# Patient Record
Sex: FEMALE | URBAN - METROPOLITAN AREA
[De-identification: names, ages, dates, MRNs, and addresses within clinical notes are randomized per-mention and may not be internally consistent; named-entity substitution may affect disease eponyms.]

---

## 2021-05-20 ENCOUNTER — TELEPHONE (OUTPATIENT)
Dept: FAMILY MEDICINE CLINIC | Facility: CLINIC | Age: 21
End: 2021-05-20

## 2021-05-20 NOTE — TELEPHONE ENCOUNTER
Patient is not assigned to our practice, but still showing up as our patient  She was never seen here  Thank you

## 2023-07-05 ENCOUNTER — APPOINTMENT (OUTPATIENT)
Dept: LAB | Facility: MEDICAL CENTER | Age: 23
End: 2023-07-05

## 2023-07-05 ENCOUNTER — OFFICE VISIT (OUTPATIENT)
Dept: FAMILY MEDICINE CLINIC | Facility: CLINIC | Age: 23
End: 2023-07-05
Payer: COMMERCIAL

## 2023-07-05 VITALS
HEIGHT: 66 IN | WEIGHT: 163.8 LBS | DIASTOLIC BLOOD PRESSURE: 68 MMHG | OXYGEN SATURATION: 99 % | TEMPERATURE: 97.4 F | BODY MASS INDEX: 26.33 KG/M2 | SYSTOLIC BLOOD PRESSURE: 114 MMHG | HEART RATE: 93 BPM

## 2023-07-05 DIAGNOSIS — R11.0 POSTPRANDIAL NAUSEA: ICD-10-CM

## 2023-07-05 DIAGNOSIS — Z76.89 ENCOUNTER TO ESTABLISH CARE: Primary | ICD-10-CM

## 2023-07-05 DIAGNOSIS — R15.2 FECAL URGENCY: ICD-10-CM

## 2023-07-05 DIAGNOSIS — F32.A ANXIETY AND DEPRESSION: ICD-10-CM

## 2023-07-05 DIAGNOSIS — F41.9 ANXIETY AND DEPRESSION: ICD-10-CM

## 2023-07-05 DIAGNOSIS — Z76.89 ENCOUNTER TO ESTABLISH CARE: ICD-10-CM

## 2023-07-05 LAB
ALBUMIN SERPL BCP-MCNC: 4 G/DL (ref 3.5–5)
ALP SERPL-CCNC: 59 U/L (ref 46–116)
ALT SERPL W P-5'-P-CCNC: 25 U/L (ref 12–78)
AMYLASE SERPL-CCNC: 46 IU/L (ref 25–115)
ANION GAP SERPL CALCULATED.3IONS-SCNC: 7 MMOL/L
AST SERPL W P-5'-P-CCNC: 15 U/L (ref 5–45)
BACTERIA UR QL AUTO: ABNORMAL /HPF
BASOPHILS # BLD AUTO: 0.02 THOUSANDS/ÂΜL (ref 0–0.1)
BASOPHILS NFR BLD AUTO: 0 % (ref 0–1)
BILIRUB SERPL-MCNC: 0.64 MG/DL (ref 0.2–1)
BILIRUB UR QL STRIP: ABNORMAL
BUN SERPL-MCNC: 6 MG/DL (ref 5–25)
CALCIUM SERPL-MCNC: 9.3 MG/DL (ref 8.3–10.1)
CAOX CRY URNS QL MICRO: ABNORMAL /HPF
CHLORIDE SERPL-SCNC: 106 MMOL/L (ref 96–108)
CLARITY UR: ABNORMAL
CO2 SERPL-SCNC: 25 MMOL/L (ref 21–32)
COLOR UR: ABNORMAL
CREAT SERPL-MCNC: 0.74 MG/DL (ref 0.6–1.3)
EOSINOPHIL # BLD AUTO: 0.05 THOUSAND/ÂΜL (ref 0–0.61)
EOSINOPHIL NFR BLD AUTO: 1 % (ref 0–6)
ERYTHROCYTE [DISTWIDTH] IN BLOOD BY AUTOMATED COUNT: 12.4 % (ref 11.6–15.1)
GFR SERPL CREATININE-BSD FRML MDRD: 115 ML/MIN/1.73SQ M
GLUCOSE SERPL-MCNC: 95 MG/DL (ref 65–140)
GLUCOSE UR STRIP-MCNC: ABNORMAL MG/DL
HCT VFR BLD AUTO: 41.4 % (ref 34.8–46.1)
HGB BLD-MCNC: 14.5 G/DL (ref 11.5–15.4)
HGB UR QL STRIP.AUTO: ABNORMAL
HYALINE CASTS #/AREA URNS LPF: ABNORMAL /LPF
IMM GRANULOCYTES # BLD AUTO: 0.02 THOUSAND/UL (ref 0–0.2)
IMM GRANULOCYTES NFR BLD AUTO: 0 % (ref 0–2)
KETONES UR STRIP-MCNC: ABNORMAL MG/DL
LEUKOCYTE ESTERASE UR QL STRIP: ABNORMAL
LIPASE SERPL-CCNC: 133 U/L (ref 73–393)
LYMPHOCYTES # BLD AUTO: 1.39 THOUSANDS/ÂΜL (ref 0.6–4.47)
LYMPHOCYTES NFR BLD AUTO: 23 % (ref 14–44)
MCH RBC QN AUTO: 32.1 PG (ref 26.8–34.3)
MCHC RBC AUTO-ENTMCNC: 35 G/DL (ref 31.4–37.4)
MCV RBC AUTO: 92 FL (ref 82–98)
MONOCYTES # BLD AUTO: 0.41 THOUSAND/ÂΜL (ref 0.17–1.22)
MONOCYTES NFR BLD AUTO: 7 % (ref 4–12)
MUCOUS THREADS UR QL AUTO: ABNORMAL
NEUTROPHILS # BLD AUTO: 4.08 THOUSANDS/ÂΜL (ref 1.85–7.62)
NEUTS SEG NFR BLD AUTO: 69 % (ref 43–75)
NITRITE UR QL STRIP: NEGATIVE
NON-SQ EPI CELLS URNS QL MICRO: ABNORMAL /HPF
NRBC BLD AUTO-RTO: 0 /100 WBCS
PH UR STRIP.AUTO: 6 [PH]
PLATELET # BLD AUTO: 268 THOUSANDS/UL (ref 149–390)
PMV BLD AUTO: 12.1 FL (ref 8.9–12.7)
POTASSIUM SERPL-SCNC: 3.8 MMOL/L (ref 3.5–5.3)
PROT SERPL-MCNC: 7.9 G/DL (ref 6.4–8.4)
PROT UR STRIP-MCNC: ABNORMAL MG/DL
RBC # BLD AUTO: 4.52 MILLION/UL (ref 3.81–5.12)
RBC #/AREA URNS AUTO: ABNORMAL /HPF
SODIUM SERPL-SCNC: 138 MMOL/L (ref 135–147)
SP GR UR STRIP.AUTO: 1.03 (ref 1–1.03)
TSH SERPL DL<=0.05 MIU/L-ACNC: 1.78 UIU/ML (ref 0.45–4.5)
UROBILINOGEN UR STRIP-ACNC: 4 MG/DL
WBC # BLD AUTO: 5.97 THOUSAND/UL (ref 4.31–10.16)
WBC #/AREA URNS AUTO: ABNORMAL /HPF

## 2023-07-05 PROCEDURE — 85025 COMPLETE CBC W/AUTO DIFF WBC: CPT

## 2023-07-05 PROCEDURE — 99204 OFFICE O/P NEW MOD 45 MIN: CPT | Performed by: FAMILY MEDICINE

## 2023-07-05 PROCEDURE — 81001 URINALYSIS AUTO W/SCOPE: CPT

## 2023-07-05 PROCEDURE — 84443 ASSAY THYROID STIM HORMONE: CPT

## 2023-07-05 PROCEDURE — 87086 URINE CULTURE/COLONY COUNT: CPT

## 2023-07-05 PROCEDURE — 83690 ASSAY OF LIPASE: CPT

## 2023-07-05 PROCEDURE — 80053 COMPREHEN METABOLIC PANEL: CPT

## 2023-07-05 PROCEDURE — 82150 ASSAY OF AMYLASE: CPT

## 2023-07-05 PROCEDURE — 36415 COLL VENOUS BLD VENIPUNCTURE: CPT

## 2023-07-05 RX ORDER — PANTOPRAZOLE SODIUM 40 MG/1
40 TABLET, DELAYED RELEASE ORAL DAILY
Qty: 90 TABLET | Refills: 0 | Status: SHIPPED | OUTPATIENT
Start: 2023-07-05

## 2023-07-05 RX ORDER — NORETHINDRONE ACETATE AND ETHINYL ESTRADIOL 1.5-30(21)
KIT ORAL
COMMUNITY
Start: 2023-05-24

## 2023-07-05 RX ORDER — NORETHINDRONE ACETATE AND ETHINYL ESTRADIOL AND FERROUS FUMARATE 1.5-30(21)
KIT ORAL
COMMUNITY
Start: 2023-05-25

## 2023-07-05 NOTE — PATIENT INSTRUCTIONS
Try looking into these apps  'Wysa', 'Cerebral' 'BetterHelp'    Try to see if either a gluten free diet or low Fodmap diet helps with the symptoms

## 2023-07-05 NOTE — PROGRESS NOTES
Assessment/Plan:    No problem-specific Assessment & Plan notes found for this encounter. Return in about 4 weeks (around 8/2/2023) for Annual physical.      Patient Instructions   Try looking into these apps  'Wysa', 'Cerebral' 'BetterHelp'    Try to see if either a gluten free diet or low Fodmap diet helps with the symptoms       Diagnoses and all orders for this visit:    Encounter to establish care  -     CBC and differential; Future  -     Comprehensive metabolic panel; Future  -     UA w Reflex to Microscopic w Reflex to Culture -Lab Collect; Future  -     Lipase; Future  -     Amylase; Future  -     TSH, 3rd generation with Free T4 reflex; Future    Postprandial nausea  -     pantoprazole (PROTONIX) 40 mg tablet; Take 1 tablet (40 mg total) by mouth daily    Anxiety and depression    Fecal urgency    Other orders  -     Deja FE 1.5/30 1.5-30 MG-MCG tablet; TAKE 1 TABLET BY MOUTH DAILY. ACTIVE PILLS ONLY. (Patient not taking: Reported on 7/5/2023)  -     norethindrone-ethinyl estradiol-iron (Junel FE 1.5/30) 1.5-30 MG-MCG tablet; TAKE 1 TABLET BY MOUTH DAILY. ACTIVE PILLS ONLY. Etiology of patient's symptoms unclear at this time. Fecal urgency may be related to IBS or underlying anxiety. Recommend adherence to a gluten free/low Fodmap diet to see if this may improve symptoms. Post prandial nausea may be related to underlying reflux, gastroparesis, anxiety. Start a trial of Protonix 40mg and follow up in 4 weeks for re-evaluation     Depression Screening Follow-up Plan: Patient's depression screening was positive with a PHQ-2 score of 4. Their PHQ-9 score was 16. Patient declines further evaluation by mental health professional and/or medications. They have no active suicidal ideations. Brief counseling provided and recommend additional follow-up/re-evaluation at next office visit. Subjective:     MARY Green is a 26year old female who presents today for an encounter to establish care. Patient has been experiencing some gastrointestinal symptoms which she would like to discuss today. Patient notes that for the past year and a half she has been having to go to the bathroom after eating a meal and recently she has also been having a lot of nausea and dry heaving after eating. She notes that sometimes she has upper back pain after eating and sometimes after a meal it is like there is a "rock" in her stomach. She also states that her urine has been darker recently although admits that her water intake is not the best. She notes that the symptoms have been progressively worsening and she has been losing weight because she does not want to eat. She has tried keeping a food diary but has not identified any specific triggers. She also tries to eat smaller meals. In fact she has lost approximately 5lbs in the past 2 weeks. She denies any fever or chills but states that she has been sweating excessively. She denies any blood or mucus in the stools as well as any constipation or diarrhea. She states that her mother had her gallbladder removed but denies any family history of colon cancer, IBS or IBD. She states that she has a long history of anxiety and depression and that her symptoms worsen whenever she is nervous or has to go out. She has not taken any medication for her depression nor has she seen a therapist in the past.       Current Outpatient Medications on File Prior to Visit   Medication Sig Dispense Refill   • norethindrone-ethinyl estradiol-iron (Junel FE 1.5/30) 1.5-30 MG-MCG tablet TAKE 1 TABLET BY MOUTH DAILY. ACTIVE PILLS ONLY. • Deja FE 1.5/30 1.5-30 MG-MCG tablet TAKE 1 TABLET BY MOUTH DAILY. ACTIVE PILLS ONLY. (Patient not taking: Reported on 7/5/2023)       No current facility-administered medications on file prior to visit. History reviewed. No pertinent past medical history. History reviewed. No pertinent surgical history.   Social History     Socioeconomic History   • Marital status: Single     Spouse name: None   • Number of children: None   • Years of education: None   • Highest education level: None   Occupational History   • None   Tobacco Use   • Smoking status: Never   • Smokeless tobacco: Never   Vaping Use   • Vaping Use: Never used   Substance and Sexual Activity   • Alcohol use: Never   • Drug use: Never   • Sexual activity: Yes     Partners: Male     Birth control/protection: Condom Male     Comment: Female Pill   Other Topics Concern   • None   Social History Narrative   • None     Social Determinants of Health     Financial Resource Strain: Not on file   Food Insecurity: Not on file   Transportation Needs: Not on file   Physical Activity: Not on file   Stress: Not on file   Social Connections: Not on file   Intimate Partner Violence: Not on file   Housing Stability: Not on file     Family History   Problem Relation Age of Onset   • Depression Mother    • Mental illness Mother    • ADD / ADHD Mother    • Anxiety disorder Mother    • Bipolar disorder Mother    • OCD Mother    • Mental illness Father    • Cancer Maternal Grandfather    • Mental illness Maternal Grandmother    • Coronary artery disease Maternal Grandmother    • Prostate cancer Paternal Grandfather    • Cancer Paternal Grandfather    • Dementia Paternal Grandmother    • Breast cancer Paternal Grandmother    • Substance Abuse Maternal Uncle    • Depression Maternal Uncle    • Prostate cancer Maternal Uncle    • Drug abuse Maternal Uncle          Review of Systems   Constitutional: Negative. HENT: Negative. Eyes: Negative. Respiratory: Negative. Cardiovascular: Negative. Gastrointestinal: Positive for nausea. Negative for abdominal distention and abdominal pain. Genitourinary: Negative. Musculoskeletal: Negative. Skin: Negative. Neurological: Negative. Psychiatric/Behavioral: Positive for dysphoric mood.         Anxiety       Objective:    /68 (BP Location: Left arm, Patient Position: Sitting, Cuff Size: Adult)   Pulse 93   Temp (!) 97.4 °F (36.3 °C) (Temporal)   Ht 5' 5.75" (1.67 m)   Wt 74.3 kg (163 lb 12.8 oz)   SpO2 99%   BMI 26.64 kg/m²     Physical Exam  Constitutional:       General: She is not in acute distress. Appearance: She is not ill-appearing. HENT:      Head: Normocephalic and atraumatic. Eyes:      General:         Right eye: No discharge. Left eye: No discharge. Extraocular Movements: Extraocular movements intact. Pupils: Pupils are equal, round, and reactive to light. Cardiovascular:      Rate and Rhythm: Normal rate. Pulmonary:      Effort: Pulmonary effort is normal. No respiratory distress. Breath sounds: No wheezing. Abdominal:      Palpations: Abdomen is soft. Tenderness: There is abdominal tenderness in the epigastric area. There is no guarding. Musculoskeletal:      Right lower leg: No edema. Left lower leg: No edema. Neurological:      General: No focal deficit present. Mental Status: She is alert.    Psychiatric:         Mood and Affect: Mood normal.         Behavior: Behavior normal.         Charlie Grider MD  07/05/23  3:48 PM

## 2023-07-07 ENCOUNTER — TELEPHONE (OUTPATIENT)
Dept: FAMILY MEDICINE CLINIC | Facility: CLINIC | Age: 23
End: 2023-07-07

## 2023-07-07 LAB — BACTERIA UR CULT: NORMAL

## 2023-07-07 NOTE — TELEPHONE ENCOUNTER
Patient called in looking for her lab and urine results. She does have an appt next week to see you as well.

## 2023-07-12 ENCOUNTER — OFFICE VISIT (OUTPATIENT)
Dept: FAMILY MEDICINE CLINIC | Facility: CLINIC | Age: 23
End: 2023-07-12
Payer: COMMERCIAL

## 2023-07-12 VITALS
WEIGHT: 162.2 LBS | HEART RATE: 87 BPM | SYSTOLIC BLOOD PRESSURE: 110 MMHG | TEMPERATURE: 97.4 F | BODY MASS INDEX: 26.07 KG/M2 | OXYGEN SATURATION: 99 % | HEIGHT: 66 IN | DIASTOLIC BLOOD PRESSURE: 70 MMHG

## 2023-07-12 DIAGNOSIS — R31.29 MICROSCOPIC HEMATURIA: ICD-10-CM

## 2023-07-12 DIAGNOSIS — Z00.00 ANNUAL PHYSICAL EXAM: Primary | ICD-10-CM

## 2023-07-12 DIAGNOSIS — R11.0 POSTPRANDIAL NAUSEA: ICD-10-CM

## 2023-07-12 DIAGNOSIS — N28.1 RENAL CYST: ICD-10-CM

## 2023-07-12 DIAGNOSIS — R80.9 PROTEINURIA, UNSPECIFIED TYPE: ICD-10-CM

## 2023-07-12 PROCEDURE — 99395 PREV VISIT EST AGE 18-39: CPT | Performed by: FAMILY MEDICINE

## 2023-07-12 NOTE — PROGRESS NOTES
[address] [place] [address],[address] PRIMARY CARE    NAME: Pamela Venegas  AGE: 26 y.o. SEX: female  : 2000     DATE: 2023     Assessment and Plan:     Problem List Items Addressed This Visit        Genitourinary    Renal cyst    Relevant Orders    US kidney and bladder   Other Visit Diagnoses     Annual physical exam    -  Primary    Microscopic hematuria        Relevant Orders    Urinalysis with microscopic    Urine culture    US abdomen complete    US kidney and bladder    Postprandial nausea        Relevant Orders    Ambulatory Referral to Gastroenterology    NM gastric emptying    US abdomen complete    Proteinuria, unspecified type        Relevant Orders    Albumin / creatinine urine ratio          Immunizations and preventive care screenings were discussed with patient today. Appropriate education was printed on patient's after visit summary. Counseling:  · {Annual Physical; Counselin}         No follow-ups on file. Chief Complaint:     Chief Complaint   Patient presents with   • Physical Exam      History of Present Illness:     Adult Annual Physical   Patient here for a comprehensive physical exam. The patient reports {problems:52502}. Not as much nausea  Gallbladder issues    More back pain and more burning when eating  No change diet     Diet and Physical Activity  · Diet/Nutrition: {annual physical; diet:98336337}. · Exercise: {annual physical; exercise:01965946}. Depression Screening  PHQ-2/9 Depression Screening    Little interest or pleasure in doing things: 0 - not at all  Feeling down, depressed, or hopeless: 0 - not at all  PHQ-2 Score: 0  PHQ-2 Interpretation: Negative depression screen       General Health  · Sleep: {annual physical; sleep:2102}. · Hearing: {annual physical; hearin}. · Vision: {annual physical; vision:}. · Dental: {annual physical; dental:}.        /GYN Health  · Last menstrual period: ***  · Contraceptive method: {contraceptive options:20677}. · History of STDs?: {YES/NO:20200}. Review of Systems:     Review of Systems   Past Medical History:     Past Medical History:   Diagnosis Date   • Amplified musculoskeletal pain syndrome       Past Surgical History:     History reviewed. No pertinent surgical history.    Social History:     Social History     Socioeconomic History   • Marital status: Single     Spouse name: None   • Number of children: None   • Years of education: None   • Highest education level: None   Occupational History   • None   Tobacco Use   • Smoking status: Never   • Smokeless tobacco: Never   Vaping Use   • Vaping Use: Never used   Substance and Sexual Activity   • Alcohol use: Never   • Drug use: Never   • Sexual activity: Yes     Partners: Male     Birth control/protection: Condom Male     Comment: Female Pill   Other Topics Concern   • None   Social History Narrative    ** Merged History Encounter **          Social Determinants of Health     Financial Resource Strain: Not on file   Food Insecurity: Not on file   Transportation Needs: Not on file   Physical Activity: Not on file   Stress: Not on file   Social Connections: Not on file   Intimate Partner Violence: Not on file   Housing Stability: Not on file      Family History:     Family History   Problem Relation Age of Onset   • Depression Mother    • Mental illness Mother    • ADD / ADHD Mother    • Anxiety disorder Mother    • Bipolar disorder Mother    • OCD Mother    • Mental illness Father    • Cancer Maternal Grandfather    • Mental illness Maternal Grandmother    • Coronary artery disease Maternal Grandmother    • Prostate cancer Paternal Grandfather    • Cancer Paternal Grandfather    • Dementia Paternal Grandmother    • Breast cancer Paternal Grandmother    • Substance Abuse Maternal Uncle    • Depression Maternal Uncle    • Prostate cancer Maternal Uncle    • Drug abuse Maternal Uncle    • Heart defect Maternal Grandmother       Current Medications:     Current Outpatient Medications   Medication Sig Dispense Refill   • norethindrone-ethinyl estradiol-iron (Junel FE 1.5/30) 1.5-30 MG-MCG tablet TAKE 1 TABLET BY MOUTH DAILY. ACTIVE PILLS ONLY. • pantoprazole (PROTONIX) 40 mg tablet Take 1 tablet (40 mg total) by mouth daily 90 tablet 0   • Deja FE 1.5/30 1.5-30 MG-MCG tablet TAKE 1 TABLET BY MOUTH DAILY. ACTIVE PILLS ONLY. (Patient not taking: Reported on 7/5/2023)       No current facility-administered medications for this visit.       Allergies:     No Known Allergies   Physical Exam:     /70 (BP Location: Left arm, Patient Position: Sitting, Cuff Size: Adult)   Pulse 87   Temp (!) 97.4 °F (36.3 °C) (Temporal)   Ht 5' 5.75" (1.67 m)   Wt 73.6 kg (162 lb 3.2 oz)   LMP 06/20/2023   SpO2 99%   BMI 26.38 kg/m²     Physical Exam     MD Rodri Bellamy

## 2023-07-12 NOTE — PROGRESS NOTES
[address] PRIMARY CARE    NAME: Antonia Murguia  AGE: 26 y.o. SEX: female  : 2000     DATE: 2023     Assessment and Plan:     Problem List Items Addressed This Visit        Genitourinary    Renal cyst    Relevant Orders    US kidney and bladder   Other Visit Diagnoses     Microscopic hematuria    -  Primary    Relevant Orders    Urinalysis with microscopic    Urine culture    US abdomen complete    US kidney and bladder    Postprandial nausea        Relevant Orders    Ambulatory Referral to Gastroenterology    NM gastric emptying    US abdomen complete    Proteinuria, unspecified type        Relevant Orders    Albumin / creatinine urine ratio        - Satisfactory physical examination  - CMP, lipase and amylase unremarkable. No improvement in postprandial nausea following addition of Protonix. At this time will order NM gastric emptying to rule out gastroparesis as well an ultrasound of the abdomen to evaluate gallbladder. Referral to gastroenterology placed as ultimately patient may need an EGD/Colonosocpy. - Repeat UA and urine culture ordered as well as urine microalbumin/creatinine ratio given proteinuria. Ultrasound renal and bladder also ordered for further evaluation. Immunizations and preventive care screenings were discussed with patient today. Appropriate education was printed on patient's after visit summary. Chief Complaint:     Chief Complaint   Patient presents with   • Physical Exam      History of Present Illness:     Adult Annual Physical   Antonia Murguia is a 26year old female who presents today for a comprehensive physical exam follow up of lab testing. At previous office visit patient was started on Protonix. She states that this has helped with the nausea throughout the day but not with the nausea after eating. She also notes that she has been getting a lot more pain in the upper abdomen and back when eating. She was advised to try and adopt a gluten free diet as well but notes that it did not make much of a difference either. She is concerned regarding her urine results which showed blood, white blood cells and protein but negative urine culture. She denies any urinary symptoms such as frequency, burning on urination or flank pain. There is a history of a left renal cyst.     Diet and Physical Activity  · Diet/Nutrition: Patient tried a gluten free diet to see if it would help with her symptoms. · Exercise: no formal exercise. Depression Screening  PHQ-2/9 Depression Screening    Little interest or pleasure in doing things: 0 - not at all  Feeling down, depressed, or hopeless: 0 - not at all  PHQ-2 Score: 0  PHQ-2 Interpretation: Negative depression screen       General Health  · Sleep: gets 5-8 hours of sleep. · Hearing: No hearing issues. · Vision: goes for regular eye exams and wears glasses. · Dental: regular dental visits. /GYN Health  · Last menstrual period: Patient's last menstrual period was 06/20/2023. · Contraceptive method: oral contraceptives. · History of STDs?: no.     Review of Systems:     Review of Systems   Constitutional: Positive for unexpected weight change. HENT: Negative. Eyes: Negative. Respiratory: Negative. Cardiovascular: Negative. Gastrointestinal: Positive for abdominal pain, diarrhea and nausea. Endocrine: Negative. Genitourinary: Negative for dysuria, flank pain, frequency, hematuria and urgency. Musculoskeletal: Negative. Neurological: Negative. Psychiatric/Behavioral: Negative. Past Medical History:     Past Medical History:   Diagnosis Date   • Amplified musculoskeletal pain syndrome       Past Surgical History:     History reviewed. No pertinent surgical history.    Social History:     Social History     Socioeconomic History   • Marital status: Single     Spouse name: None   • Number of children: None   • Years of education: None • Highest education level: None   Occupational History   • None   Tobacco Use   • Smoking status: Never   • Smokeless tobacco: Never   Vaping Use   • Vaping Use: Never used   Substance and Sexual Activity   • Alcohol use: Never   • Drug use: Never   • Sexual activity: Yes     Partners: Male     Birth control/protection: Condom Male     Comment: Female Pill   Other Topics Concern   • None   Social History Narrative    ** Merged History Encounter **          Social Determinants of Health     Financial Resource Strain: Not on file   Food Insecurity: Not on file   Transportation Needs: Not on file   Physical Activity: Not on file   Stress: Not on file   Social Connections: Not on file   Intimate Partner Violence: Not on file   Housing Stability: Not on file      Family History:     Family History   Problem Relation Age of Onset   • Depression Mother    • Mental illness Mother    • ADD / ADHD Mother    • Anxiety disorder Mother    • Bipolar disorder Mother    • OCD Mother    • Mental illness Father    • Cancer Maternal Grandfather    • Mental illness Maternal Grandmother    • Coronary artery disease Maternal Grandmother    • Prostate cancer Paternal Grandfather    • Cancer Paternal Grandfather    • Dementia Paternal Grandmother    • Breast cancer Paternal Grandmother    • Substance Abuse Maternal Uncle    • Depression Maternal Uncle    • Prostate cancer Maternal Uncle    • Drug abuse Maternal Uncle    • Heart defect Maternal Grandmother       Current Medications:     Current Outpatient Medications   Medication Sig Dispense Refill   • norethindrone-ethinyl estradiol-iron (Junel FE 1.5/30) 1.5-30 MG-MCG tablet TAKE 1 TABLET BY MOUTH DAILY. ACTIVE PILLS ONLY. • pantoprazole (PROTONIX) 40 mg tablet Take 1 tablet (40 mg total) by mouth daily 90 tablet 0   • Deja FE 1.5/30 1.5-30 MG-MCG tablet TAKE 1 TABLET BY MOUTH DAILY. ACTIVE PILLS ONLY.  (Patient not taking: Reported on 7/5/2023)       No current facility-administered medications for this visit. Allergies:     No Known Allergies   Physical Exam:     /70 (BP Location: Left arm, Patient Position: Sitting, Cuff Size: Adult)   Pulse 87   Temp (!) 97.4 °F (36.3 °C) (Temporal)   Ht 5' 5.75" (1.67 m)   Wt 73.6 kg (162 lb 3.2 oz)   LMP 06/20/2023   SpO2 99%   BMI 26.38 kg/m²     Physical Exam  Constitutional:       General: She is not in acute distress. Appearance: She is not ill-appearing. HENT:      Head: Normocephalic and atraumatic. Mouth/Throat:      Mouth: Mucous membranes are moist.      Pharynx: No oropharyngeal exudate or posterior oropharyngeal erythema. Eyes:      General:         Right eye: No discharge. Left eye: No discharge. Extraocular Movements: Extraocular movements intact. Pupils: Pupils are equal, round, and reactive to light. Cardiovascular:      Rate and Rhythm: Normal rate. Pulmonary:      Effort: Pulmonary effort is normal. No respiratory distress. Breath sounds: No wheezing. Abdominal:      General: Bowel sounds are normal.      Palpations: Abdomen is soft. Tenderness: There is abdominal tenderness in the right lower quadrant, suprapubic area and left lower quadrant. There is no guarding. Neurological:      General: No focal deficit present. Mental Status: She is alert. Motor: No weakness.       Coordination: Coordination normal.      Gait: Gait normal.      Deep Tendon Reflexes: Reflexes normal.   Psychiatric:         Mood and Affect: Mood normal.         Behavior: Behavior normal.          MD Rodri Noguera

## 2023-07-24 ENCOUNTER — LAB (OUTPATIENT)
Dept: LAB | Facility: MEDICAL CENTER | Age: 23
End: 2023-07-24
Payer: COMMERCIAL

## 2023-07-24 DIAGNOSIS — R31.29 MICROSCOPIC HEMATURIA: ICD-10-CM

## 2023-07-24 DIAGNOSIS — R80.9 PROTEINURIA, UNSPECIFIED TYPE: ICD-10-CM

## 2023-07-24 LAB
AMORPH URATE CRY URNS QL MICRO: ABNORMAL
BACTERIA UR QL AUTO: ABNORMAL /HPF
BILIRUB UR QL STRIP: NEGATIVE
CAOX CRY URNS QL MICRO: ABNORMAL /HPF
CLARITY UR: ABNORMAL
COLOR UR: ABNORMAL
CREAT UR-MCNC: 482 MG/DL
GLUCOSE UR STRIP-MCNC: NEGATIVE MG/DL
HGB UR QL STRIP.AUTO: NEGATIVE
KETONES UR STRIP-MCNC: NEGATIVE MG/DL
LEUKOCYTE ESTERASE UR QL STRIP: ABNORMAL
MICROALBUMIN UR-MCNC: 33.7 MG/L (ref 0–20)
MICROALBUMIN/CREAT 24H UR: 7 MG/G CREATININE (ref 0–30)
MUCOUS THREADS UR QL AUTO: ABNORMAL
NITRITE UR QL STRIP: NEGATIVE
NON-SQ EPI CELLS URNS QL MICRO: ABNORMAL /HPF
PH UR STRIP.AUTO: 6 [PH]
PROT UR STRIP-MCNC: ABNORMAL MG/DL
RBC #/AREA URNS AUTO: ABNORMAL /HPF
SP GR UR STRIP.AUTO: 1.03 (ref 1–1.03)
UROBILINOGEN UR STRIP-ACNC: 6 MG/DL
WBC #/AREA URNS AUTO: ABNORMAL /HPF

## 2023-07-24 PROCEDURE — 82043 UR ALBUMIN QUANTITATIVE: CPT

## 2023-07-24 PROCEDURE — 82570 ASSAY OF URINE CREATININE: CPT

## 2023-07-24 PROCEDURE — 87186 SC STD MICRODIL/AGAR DIL: CPT

## 2023-07-24 PROCEDURE — 87086 URINE CULTURE/COLONY COUNT: CPT

## 2023-07-24 PROCEDURE — 87077 CULTURE AEROBIC IDENTIFY: CPT

## 2023-07-24 PROCEDURE — 81001 URINALYSIS AUTO W/SCOPE: CPT

## 2023-07-26 LAB — BACTERIA UR CULT: ABNORMAL

## 2023-07-27 ENCOUNTER — HOSPITAL ENCOUNTER (OUTPATIENT)
Dept: ULTRASOUND IMAGING | Facility: HOSPITAL | Age: 23
End: 2023-07-27
Payer: COMMERCIAL

## 2023-07-27 DIAGNOSIS — R31.29 MICROSCOPIC HEMATURIA: ICD-10-CM

## 2023-07-27 DIAGNOSIS — R31.29 MICROSCOPIC HEMATURIA: Primary | ICD-10-CM

## 2023-07-27 DIAGNOSIS — R11.0 POSTPRANDIAL NAUSEA: ICD-10-CM

## 2023-07-27 DIAGNOSIS — N28.1 RENAL CYST: ICD-10-CM

## 2023-07-27 PROCEDURE — 76700 US EXAM ABDOM COMPLETE: CPT

## 2023-07-27 PROCEDURE — 76775 US EXAM ABDO BACK WALL LIM: CPT

## 2023-07-27 RX ORDER — NITROFURANTOIN 25; 75 MG/1; MG/1
100 CAPSULE ORAL 2 TIMES DAILY
Qty: 10 CAPSULE | Refills: 0 | Status: SHIPPED | OUTPATIENT
Start: 2023-07-27 | End: 2023-08-01

## 2023-08-15 ENCOUNTER — HOSPITAL ENCOUNTER (OUTPATIENT)
Dept: NUCLEAR MEDICINE | Facility: HOSPITAL | Age: 23
Discharge: HOME | End: 2023-08-15
Attending: FAMILY MEDICINE
Payer: COMMERCIAL

## 2023-08-15 DIAGNOSIS — R11.0 POSTPRANDIAL NAUSEA: ICD-10-CM

## 2023-08-15 PROCEDURE — A9541 TC99M SULFUR COLLOID: HCPCS

## 2023-08-15 PROCEDURE — G1004 CDSM NDSC: HCPCS

## 2023-08-15 PROCEDURE — 78264 GASTRIC EMPTYING IMG STUDY: CPT

## 2023-08-21 ENCOUNTER — APPOINTMENT (OUTPATIENT)
Dept: LAB | Facility: MEDICAL CENTER | Age: 23
End: 2023-08-21
Payer: COMMERCIAL

## 2023-08-21 ENCOUNTER — TELEPHONE (OUTPATIENT)
Dept: GASTROENTEROLOGY | Facility: CLINIC | Age: 23
End: 2023-08-21

## 2023-08-21 ENCOUNTER — OFFICE VISIT (OUTPATIENT)
Dept: GASTROENTEROLOGY | Facility: CLINIC | Age: 23
End: 2023-08-21
Payer: COMMERCIAL

## 2023-08-21 VITALS
SYSTOLIC BLOOD PRESSURE: 112 MMHG | WEIGHT: 152.8 LBS | TEMPERATURE: 97.4 F | HEIGHT: 66 IN | DIASTOLIC BLOOD PRESSURE: 64 MMHG | BODY MASS INDEX: 24.56 KG/M2

## 2023-08-21 DIAGNOSIS — R10.84 GENERALIZED ABDOMINAL PAIN: ICD-10-CM

## 2023-08-21 DIAGNOSIS — R11.0 POSTPRANDIAL NAUSEA: ICD-10-CM

## 2023-08-21 DIAGNOSIS — R19.4 CHANGE IN BOWEL HABIT: ICD-10-CM

## 2023-08-21 DIAGNOSIS — R11.0 POSTPRANDIAL NAUSEA: Primary | ICD-10-CM

## 2023-08-21 DIAGNOSIS — R63.4 WEIGHT LOSS, UNINTENTIONAL: ICD-10-CM

## 2023-08-21 LAB
CRP SERPL QL: 7.5 MG/L
IGA SERPL-MCNC: 297 MG/DL (ref 70–400)

## 2023-08-21 PROCEDURE — 36415 COLL VENOUS BLD VENIPUNCTURE: CPT

## 2023-08-21 PROCEDURE — 86140 C-REACTIVE PROTEIN: CPT

## 2023-08-21 PROCEDURE — 86364 TISS TRNSGLTMNASE EA IG CLAS: CPT

## 2023-08-21 PROCEDURE — 99244 OFF/OP CNSLTJ NEW/EST MOD 40: CPT | Performed by: PHYSICIAN ASSISTANT

## 2023-08-21 PROCEDURE — 82784 ASSAY IGA/IGD/IGG/IGM EACH: CPT

## 2023-08-21 RX ORDER — FAMOTIDINE 20 MG/1
20 TABLET, FILM COATED ORAL
Qty: 30 TABLET | Refills: 2 | Status: SHIPPED | OUTPATIENT
Start: 2023-08-21

## 2023-08-21 NOTE — PROGRESS NOTES
[facility] - Outpatient Consultation New Patient  Michelle Britton 26 y.o. female MRN: [id number]  Encounter: [id number]          ASSESSMENT AND PLAN:      1. Postprandial nausea  Recent TSH, CBC, CMP normal. Reviewed complete abdominal US which was normal as well as NM gastric emptying study, also normal. She has lost a significant amount of weight. Will plan labs and endoscopic evaluation. Advised she continue with pantoprazole 40 mg in the AM prior to breakfast. We also dicussed and I recommended GERD bland diet. Encouraged small frequent meals. We will trial famotidine 40 mg once daily at bedtime in the meantime to see if this helps her possible atypical GERD symptoms     - Ambulatory Referral to Gastroenterology  - IgA; Future  - Tissue transglutaminase, IgA; Future  - C-reactive protein; Future  - famotidine (PEPCID) 20 mg tablet; Take 1 tablet (20 mg total) by mouth daily at bedtime  Dispense: 30 tablet; Refill: 2  - Colonoscopy; Future  - EGD; Future    2. Weight loss, unintentional- 20 lbs in 2 months   Recent TSH normal. Reviewed complete abdominal US which was normal as well as NM gastric emptying study. Will plan EGD and colonoscopy and celiac testing. Will continue to monitor. If symptoms/ weight loss persists may need to consider CT imaging.   - IgA; Future  - Tissue transglutaminase, IgA; Future  - C-reactive protein; Future  - Colonoscopy; Future  - EGD; Future  - polyethylene glycol (GOLYTELY) 4000 mL solution; As per colonoscopy prep instructions. Dispense: 4000 mL; Refill: 0    3. Generalized abdominal pain  As above. Can consider dicyclomine in follow up if no improvement. - IgA; Future  - Tissue transglutaminase, IgA; Future  - C-reactive protein; Future  - famotidine (PEPCID) 20 mg tablet; Take 1 tablet (20 mg total) by mouth daily at bedtime  Dispense: 30 tablet; Refill: 2  - Colonoscopy; Future  - EGD; Future    4. Change in bowel habit  Checking celiac and CRP. Will plan colonoscopy at time of EGD. Consider fiber supplementation in follow up     - IgA; Future  - Tissue transglutaminase, IgA; Future  - C-reactive protein; Future  - Colonoscopy; Future  - polyethylene glycol (GOLYTELY) 4000 mL solution; As per colonoscopy prep instructions. Dispense: 4000 mL; Refill: 0      Patient was instructed to call the office with any questions, concerns, new/ worsening/ persisting GI symptoms. Patient expressed understanding and is in agreement with treatment plan. Will plan to follow up after diagnostic tests in a few months   ________________________________________________________    HPI:      Patient is new to me and our office. She has never seen GI before. She presents today with CC of worsening nausea, fullness, abdominal pain x 1 year. Prior to 1 year ago she did not have any GI issues. She complains of chronic nausea after eating. She eats very little food an then feels full and nauseous. She is fearful to eat because of these symptoms. No vomiting. No heartburn. No reflux or regurg. She does have " dry heaves" and coughing after eating. Symptoms are daily and have worsened over the last year to the point that she has lost 20 lbs in the last 2 months per patient. She is unsure of any triggers or food triggers. She thinks her symptoms improve around her menses but is unsure. She has been on pantoprazole 40 mg once daily in AM- started by PCP. Patient states she started this in the beginning of July. Symptoms remain despite PPI therapy. She also complains of change in bowels, episodes of diarrhea x 1 year. Normally she had formed BMs/ daily. Now she has 1-2 formed stools/ day with occasional episodes of loose stools with urgency. No nocturnal stools. Diarrhea is usually post- prandial. Associated generalized abdominal pain. Patient denies any fevers/ chills,  black or bloody stools, heartburn, dysphagia, odynophagia.    Denies chest pain, SOB, skin rashes, joint pain/ swelling. She denies change in diet medication or travel prior to symptom onset     Tobacco use- None  Alcohol use- None  NSAID use-  Rare   She denies first denies degree relative with CRC or IBD   She does not family history of gallbladder issues. No prior EGD or colonoscopy. She graduated in May from [employer]. REVIEW OF SYSTEMS:    Review of Systems   Constitutional: Positive for appetite change (decreased) and unexpected weight change (weight loss). Negative for chills and fever. HENT: Negative for ear pain and sore throat. Eyes: Negative for pain and visual disturbance. Respiratory: Positive for cough (after eating ). Negative for shortness of breath. Cardiovascular: Negative for chest pain and palpitations. Gastrointestinal: Positive for abdominal pain, diarrhea and nausea. Negative for vomiting. Genitourinary: Negative for dysuria and hematuria. Musculoskeletal: Negative for arthralgias and back pain. Skin: Negative for color change and rash. Neurological: Negative for seizures and syncope. All other systems reviewed and are negative. Historical Information   Past Medical History:   Diagnosis Date   • Amplified musculoskeletal pain syndrome      History reviewed. No pertinent surgical history.   Social History   Social History     Substance and Sexual Activity   Alcohol Use Never     Social History     Substance and Sexual Activity   Drug Use Never     Social History     Tobacco Use   Smoking Status Never   Smokeless Tobacco Never     Family History   Problem Relation Age of Onset   • Depression Mother    • Mental illness Mother    • ADD / ADHD Mother    • Anxiety disorder Mother    • Bipolar disorder Mother    • OCD Mother    • Mental illness Father    • Cancer Maternal Grandfather    • Mental illness Maternal Grandmother    • Coronary artery disease Maternal Grandmother    • Prostate cancer Paternal Grandfather    • Cancer Paternal Grandfather    • Dementia Paternal Grandmother    • Breast cancer Paternal Grandmother    • Substance Abuse Maternal Uncle    • Depression Maternal Uncle    • Prostate cancer Maternal Uncle    • Drug abuse Maternal Uncle    • Heart defect Maternal Grandmother        Meds/Allergies       Current Outpatient Medications:   •  pantoprazole (PROTONIX) 40 mg tablet  •  Deja FE 1.5/30 1.5-30 MG-MCG tablet  •  norethindrone-ethinyl estradiol-iron (Junel FE 1.5/30) 1.5-30 MG-MCG tablet    No Known Allergies        Objective   Wt Readings from Last 3 Encounters:   08/21/23 69.3 kg (152 lb 12.8 oz)   07/12/23 73.6 kg (162 lb 3.2 oz)   07/05/23 74.3 kg (163 lb 12.8 oz)     Temp Readings from Last 3 Encounters:   08/21/23 (!) 97.4 °F (36.3 °C) (Tympanic)   07/12/23 (!) 97.4 °F (36.3 °C) (Temporal)   07/05/23 (!) 97.4 °F (36.3 °C) (Temporal)     BP Readings from Last 3 Encounters:   08/21/23 112/64   07/12/23 110/70   07/05/23 114/68     Pulse Readings from Last 3 Encounters:   07/12/23 87   07/05/23 93   08/19/19 81        PHYSICAL EXAM:     Physical Exam  Vitals reviewed. Constitutional:       General: She is not in acute distress. Appearance: She is not toxic-appearing. HENT:      Head: Normocephalic and atraumatic. Eyes:      Extraocular Movements: Extraocular movements intact. Conjunctiva/sclera: Conjunctivae normal.   Cardiovascular:      Rate and Rhythm: Normal rate and regular rhythm. Pulmonary:      Effort: Pulmonary effort is normal. No respiratory distress. Breath sounds: Normal breath sounds. Abdominal:      General: Bowel sounds are normal.      Palpations: Abdomen is soft. Tenderness: There is generalized abdominal tenderness (mild). Musculoskeletal:         General: No swelling or tenderness. Cervical back: Normal range of motion and neck supple. Skin:     General: Skin is warm and dry. Coloration: Skin is not jaundiced. Neurological:      General: No focal deficit present.       Mental Status: She is alert and oriented to person, place, and time. Mental status is at baseline. Psychiatric:         Mood and Affect: Mood normal.         Behavior: Behavior normal.         Thought Content: Thought content normal.         Lab Results:   Lab Results   Component Value Date    WBC 5.97 07/05/2023    HGB 14.5 07/05/2023    HCT 41.4 07/05/2023    MCV 92 07/05/2023     07/05/2023       Lab Results   Component Value Date    SODIUM 138 07/05/2023    K 3.8 07/05/2023     07/05/2023    CO2 25 07/05/2023    AGAP 7 07/05/2023    BUN 6 07/05/2023    CREATININE 0.74 07/05/2023    GLUC 95 07/05/2023    CALCIUM 9.3 07/05/2023    AST 15 07/05/2023    ALT 25 07/05/2023    ALKPHOS 59 07/05/2023    TP 7.9 07/05/2023    TBILI 0.64 07/05/2023    EGFR 115 07/05/2023     Lab Results   Component Value Date    YZV9DPZPBOXN 1.778 07/05/2023     Lab Results   Component Value Date    AMYLASE 46 07/05/2023     Lab Results   Component Value Date    LIPASE 133 07/05/2023     Radiology Results:   NM gastric emptying    Result Date: 8/15/2023  Narrative: GASTRIC EMPTYING STUDY INDICATION: R11.0: Nausea COMPARISON:  None available TECHNIQUE:   The study was performed following the oral administration of 1.1 mCi Tc-99m sulfur colloid combined with scrambled eggs, as part of a standard meal.  Following the meal, one minute anterior and posterior images were obtained immediately and at 0.25 hours, 0.5 hour, 1 hour, 1.5 hour, 2 hour, 3 hour and 4 hour intervals from the time of ingestion. Geometric mean analyses were then performed. FINDINGS: Gastric emptying at 0.5 hours = 12% (N < 30%) Gastric emptying at 1 hour = 27% (N = 10 - 70%) Gastric emptying at 2 hours = 49% (N = > 40%) Gastric emptying at 3 hours = 73% (N = > 70%) Gastric emptying at 4 hours = 97% (N = >90%) Linear T-1/2 = 123.26 minutes     Impression: Normal rate of gastric emptying.  Resident: Lyly Sanford, the attending radiologist, have reviewed the images and agree with the final report above. Workstation performed: [id number]     US kidney and bladder    Result Date: 8/9/2023  Narrative: ABDOMEN ULTRASOUND, COMPLETE INDICATION:   R31.29: Other microscopic hematuria R11.0: Nausea. COMPARISON:  None TECHNIQUE:   Real-time ultrasound of the abdomen was performed with a curvilinear transducer with both volumetric sweeps and still imaging techniques. FINDINGS: PANCREAS:  Visualized portions of the pancreas are within normal limits. AORTA AND IVC:  Visualized portions are normal for patient age. LIVER: Size:  Within normal range. The liver measures 13.7 cm in the midclavicular line. Contour:  Surface contour is smooth. Parenchyma:  Echogenicity and echotexture are within normal limits. No liver mass identified. Limited imaging of the main portal vein shows it to be patent and hepatopetal. BILIARY: No gallbladder findings. No intrahepatic biliary dilatation. CBD measures 5.0 mm. No choledocholithiasis. KIDNEY: Right kidney measures 12.3 x 4.1 x 4.8  cm. Volume 126.5 mL Kidney within normal limits. Left kidney measures 11.3 x 6.0 x 5.4 cm. Volume 193.4 mL Kidney within normal limits. SPLEEN: Measures 11.0 x 11.0 x 4.2 cm. Volume 267.9 mL Within normal limits. ASCITES:  None. Incidental note of normal bladder. Impression: Normal. Workstation performed: [id number]     US abdomen complete    Result Date: 7/28/2023  Narrative: ABDOMEN ULTRASOUND, COMPLETE INDICATION:   R31.29: Other microscopic hematuria R11.0: Nausea. COMPARISON:  None TECHNIQUE:   Real-time ultrasound of the abdomen was performed with a curvilinear transducer with both volumetric sweeps and still imaging techniques. FINDINGS: PANCREAS:  Visualized portions of the pancreas are within normal limits. AORTA AND IVC:  Visualized portions are normal for patient age. LIVER: Size:  Within normal range. The liver measures 13.7 cm in the midclavicular line. Contour:  Surface contour is smooth.  Parenchyma: Echogenicity and echotexture are within normal limits. No liver mass identified. Limited imaging of the main portal vein shows it to be patent and hepatopetal. BILIARY: No gallbladder findings. No intrahepatic biliary dilatation. CBD measures 5.0 mm. No choledocholithiasis. KIDNEY: Right kidney measures 12.3 x 4.1 x 4.8  cm. Volume 126.5 mL Kidney within normal limits. Left kidney measures 11.3 x 6.0 x 5.4 cm. Volume 193.4 mL Kidney within normal limits. SPLEEN: Measures 11.0 x 11.0 x 4.2 cm. Volume 267.9 mL Within normal limits. ASCITES:  None. Incidental note of normal bladder.      Impression: Normal. Workstation performed: [id number]       Nancy Prasad PA-C   Available on [address]

## 2023-08-21 NOTE — PATIENT INSTRUCTIONS
GERD (Gastroesophageal Reflux Disease)   AMBULATORY CARE:   Gastroesophageal reflux disease (GERD)  is reflux that happens more than 2 times a week for a few weeks. Reflux means acid and food in your stomach back up into your esophagus. GERD can cause other health problems over time if it is not treated. Common causes of GERD:  GERD often happens because the lower muscle (sphincter) of the esophagus does not close properly. The sphincter normally opens to let food into the stomach. It then closes to keep food and stomach acid in the stomach. If the sphincter does not close properly, stomach acid and food back up (reflux) into the esophagus. The following may increase your risk for GERD:  Certain foods such as spicy foods, chocolate, foods that contain caffeine, peppermint, and fried foods    Hiatal hernia    Certain medicines such as calcium channel blockers (used to treat high blood pressure), allergy medicines, sedatives, or antidepressants    Pregnancy, obesity, or scleroderma    Lying down after a meal    Drinking alcohol or smoking cigarettes    Signs and symptoms:   Heartburn (burning pain in your chest)    Pain after meals that spreads to your neck, jaw, or shoulder    Pain that gets better when you change positions    Bitter or acid taste in your mouth    A dry cough    Trouble swallowing or pain with swallowing    Hoarseness or a sore throat    Burping or hiccups    Feeling full soon after you start eating    Call your local emergency number (911 in the [address]) if:   You have severe chest pain and sudden trouble breathing. Seek care immediately if:   You have trouble breathing after you vomit. You have trouble swallowing, or pain with swallowing. Your bowel movements are black, bloody, or tarry-looking. Your vomit looks like coffee grounds or has blood in it. Call your doctor or gastroenterologist if:   You feel full and cannot burp or vomit.     You vomit large amounts, or you vomit often.    You are losing weight without trying. Your symptoms get worse or do not improve with treatment. You have questions or concerns about your condition or care. Treatment for GERD:   Medicines  are used to decrease stomach acid. Medicine may also be used to help your lower esophageal sphincter and stomach contract (tighten) more. Surgery  is done to wrap the upper part of the stomach around the esophageal sphincter. This will strengthen the sphincter and prevent reflux. Manage GERD:       Do not have foods or drinks that may increase heartburn. These include chocolate, peppermint, fried or fatty foods, drinks that contain caffeine, or carbonated drinks (soda). Other foods include spicy foods, onions, tomatoes, and tomato-based foods. Do not have foods or drinks that can irritate your esophagus, such as citrus fruits, juices, and alcohol. Do not eat large meals. When you eat a lot of food at one time, your stomach needs more acid to digest it. Eat 6 small meals each day instead of 3 large meals, and eat slowly. Do not eat meals 2 to 3 hours before bedtime. Elevate the head of your bed. Place 6-inch blocks under the head of your bed frame. You may also use more than one pillow under your head and shoulders while you sleep. Maintain a healthy weight. If you are overweight, weight loss may help relieve symptoms of GERD. Do not smoke. Smoking weakens the lower esophageal sphincter and increases the risk of GERD. Ask your healthcare provider for information if you currently smoke and need help to quit. E-cigarettes or smokeless tobacco still contain nicotine. Talk to your healthcare provider before you use these products. Do not put pressure on your abdomen. Pressure pushes acid up into your esophagus. Do not wear clothing that is tight around your waist. Do not bend over. Bend at the knees if you need to pick something up.   Follow up with your doctor or gastroenterologist as directed:  Write down your questions so you remember to ask them during your visits. © Copyright [employer] 2022 Information is for End User's use only and may not be sold, redistributed or otherwise used for commercial purposes. The above information is an  only. It is not intended as medical advice for individual conditions or treatments. Talk to your doctor, nurse or pharmacist before following any medical regimen to see if it is safe and effective for you.   Scheduled date of EGD/colonoscopy (as of today):08.31.23  Physician performing EGD/colonoscopy:DR [facility]M  Location of EGD/colonoscopy:[facility]  Desired bowel prep reviewed with patient:JANUSZ  Instructions reviewed with patient by:RIC  Clearances:   N/A

## 2023-08-21 NOTE — H&P (VIEW-ONLY)
[facility] - Outpatient Consultation New Patient  Marci Boxer 26 y.o. female MRN: [id number]  Encounter: [id number]          ASSESSMENT AND PLAN:      1. Postprandial nausea  Recent TSH, CBC, CMP normal. Reviewed complete abdominal US which was normal as well as NM gastric emptying study, also normal. She has lost a significant amount of weight. Will plan labs and endoscopic evaluation. Advised she continue with pantoprazole 40 mg in the AM prior to breakfast. We also dicussed and I recommended GERD bland diet. Encouraged small frequent meals. We will trial famotidine 40 mg once daily at bedtime in the meantime to see if this helps her possible atypical GERD symptoms     - Ambulatory Referral to Gastroenterology  - IgA; Future  - Tissue transglutaminase, IgA; Future  - C-reactive protein; Future  - famotidine (PEPCID) 20 mg tablet; Take 1 tablet (20 mg total) by mouth daily at bedtime  Dispense: 30 tablet; Refill: 2  - Colonoscopy; Future  - EGD; Future    2. Weight loss, unintentional- 20 lbs in 2 months   Recent TSH normal. Reviewed complete abdominal US which was normal as well as NM gastric emptying study. Will plan EGD and colonoscopy and celiac testing. Will continue to monitor. If symptoms/ weight loss persists may need to consider CT imaging.   - IgA; Future  - Tissue transglutaminase, IgA; Future  - C-reactive protein; Future  - Colonoscopy; Future  - EGD; Future  - polyethylene glycol (GOLYTELY) 4000 mL solution; As per colonoscopy prep instructions. Dispense: 4000 mL; Refill: 0    3. Generalized abdominal pain  As above. Can consider dicyclomine in follow up if no improvement. - IgA; Future  - Tissue transglutaminase, IgA; Future  - C-reactive protein; Future  - famotidine (PEPCID) 20 mg tablet; Take 1 tablet (20 mg total) by mouth daily at bedtime  Dispense: 30 tablet; Refill: 2  - Colonoscopy; Future  - EGD; Future    4. Change in bowel habit  Checking celiac and CRP. Will plan colonoscopy at time of EGD. Consider fiber supplementation in follow up     - IgA; Future  - Tissue transglutaminase, IgA; Future  - C-reactive protein; Future  - Colonoscopy; Future  - polyethylene glycol (GOLYTELY) 4000 mL solution; As per colonoscopy prep instructions. Dispense: 4000 mL; Refill: 0      Patient was instructed to call the office with any questions, concerns, new/ worsening/ persisting GI symptoms. Patient expressed understanding and is in agreement with treatment plan. Will plan to follow up after diagnostic tests in a few months   ________________________________________________________    HPI:      Patient is new to me and our office. She has never seen GI before. She presents today with CC of worsening nausea, fullness, abdominal pain x 1 year. Prior to 1 year ago she did not have any GI issues. She complains of chronic nausea after eating. She eats very little food an then feels full and nauseous. She is fearful to eat because of these symptoms. No vomiting. No heartburn. No reflux or regurg. She does have " dry heaves" and coughing after eating. Symptoms are daily and have worsened over the last year to the point that she has lost 20 lbs in the last 2 months per patient. She is unsure of any triggers or food triggers. She thinks her symptoms improve around her menses but is unsure. She has been on pantoprazole 40 mg once daily in AM- started by PCP. Patient states she started this in the beginning of July. Symptoms remain despite PPI therapy. She also complains of change in bowels, episodes of diarrhea x 1 year. Normally she had formed BMs/ daily. Now she has 1-2 formed stools/ day with occasional episodes of loose stools with urgency. No nocturnal stools. Diarrhea is usually post- prandial. Associated generalized abdominal pain. Patient denies any fevers/ chills,  black or bloody stools, heartburn, dysphagia, odynophagia.    Denies chest pain, SOB, skin rashes, joint pain/ swelling. She denies change in diet medication or travel prior to symptom onset     Tobacco use- None  Alcohol use- None  NSAID use-  Rare   She denies first denies degree relative with CRC or IBD   She does not family history of gallbladder issues. No prior EGD or colonoscopy. She graduated in May from [employer]. REVIEW OF SYSTEMS:    Review of Systems   Constitutional: Positive for appetite change (decreased) and unexpected weight change (weight loss). Negative for chills and fever. HENT: Negative for ear pain and sore throat. Eyes: Negative for pain and visual disturbance. Respiratory: Positive for cough (after eating ). Negative for shortness of breath. Cardiovascular: Negative for chest pain and palpitations. Gastrointestinal: Positive for abdominal pain, diarrhea and nausea. Negative for vomiting. Genitourinary: Negative for dysuria and hematuria. Musculoskeletal: Negative for arthralgias and back pain. Skin: Negative for color change and rash. Neurological: Negative for seizures and syncope. All other systems reviewed and are negative. Historical Information   Past Medical History:   Diagnosis Date   • Amplified musculoskeletal pain syndrome      History reviewed. No pertinent surgical history.   Social History   Social History     Substance and Sexual Activity   Alcohol Use Never     Social History     Substance and Sexual Activity   Drug Use Never     Social History     Tobacco Use   Smoking Status Never   Smokeless Tobacco Never     Family History   Problem Relation Age of Onset   • Depression Mother    • Mental illness Mother    • ADD / ADHD Mother    • Anxiety disorder Mother    • Bipolar disorder Mother    • OCD Mother    • Mental illness Father    • Cancer Maternal Grandfather    • Mental illness Maternal Grandmother    • Coronary artery disease Maternal Grandmother    • Prostate cancer Paternal Grandfather    • Cancer Paternal Grandfather    • Dementia Paternal Grandmother    • Breast cancer Paternal Grandmother    • Substance Abuse Maternal Uncle    • Depression Maternal Uncle    • Prostate cancer Maternal Uncle    • Drug abuse Maternal Uncle    • Heart defect Maternal Grandmother        Meds/Allergies       Current Outpatient Medications:   •  pantoprazole (PROTONIX) 40 mg tablet  •  Deja FE 1.5/30 1.5-30 MG-MCG tablet  •  norethindrone-ethinyl estradiol-iron (Junel FE 1.5/30) 1.5-30 MG-MCG tablet    No Known Allergies        Objective   Wt Readings from Last 3 Encounters:   08/21/23 69.3 kg (152 lb 12.8 oz)   07/12/23 73.6 kg (162 lb 3.2 oz)   07/05/23 74.3 kg (163 lb 12.8 oz)     Temp Readings from Last 3 Encounters:   08/21/23 (!) 97.4 °F (36.3 °C) (Tympanic)   07/12/23 (!) 97.4 °F (36.3 °C) (Temporal)   07/05/23 (!) 97.4 °F (36.3 °C) (Temporal)     BP Readings from Last 3 Encounters:   08/21/23 112/64   07/12/23 110/70   07/05/23 114/68     Pulse Readings from Last 3 Encounters:   07/12/23 87   07/05/23 93   08/19/19 81        PHYSICAL EXAM:     Physical Exam  Vitals reviewed. Constitutional:       General: She is not in acute distress. Appearance: She is not toxic-appearing. HENT:      Head: Normocephalic and atraumatic. Eyes:      Extraocular Movements: Extraocular movements intact. Conjunctiva/sclera: Conjunctivae normal.   Cardiovascular:      Rate and Rhythm: Normal rate and regular rhythm. Pulmonary:      Effort: Pulmonary effort is normal. No respiratory distress. Breath sounds: Normal breath sounds. Abdominal:      General: Bowel sounds are normal.      Palpations: Abdomen is soft. Tenderness: There is generalized abdominal tenderness (mild). Musculoskeletal:         General: No swelling or tenderness. Cervical back: Normal range of motion and neck supple. Skin:     General: Skin is warm and dry. Coloration: Skin is not jaundiced. Neurological:      General: No focal deficit present.       Mental Status: She is alert and oriented to person, place, and time. Mental status is at baseline. Psychiatric:         Mood and Affect: Mood normal.         Behavior: Behavior normal.         Thought Content: Thought content normal.         Lab Results:   Lab Results   Component Value Date    WBC 5.97 07/05/2023    HGB 14.5 07/05/2023    HCT 41.4 07/05/2023    MCV 92 07/05/2023     07/05/2023       Lab Results   Component Value Date    SODIUM 138 07/05/2023    K 3.8 07/05/2023     07/05/2023    CO2 25 07/05/2023    AGAP 7 07/05/2023    BUN 6 07/05/2023    CREATININE 0.74 07/05/2023    GLUC 95 07/05/2023    CALCIUM 9.3 07/05/2023    AST 15 07/05/2023    ALT 25 07/05/2023    ALKPHOS 59 07/05/2023    TP 7.9 07/05/2023    TBILI 0.64 07/05/2023    EGFR 115 07/05/2023     Lab Results   Component Value Date    JUF4BEKPAAMT 1.778 07/05/2023     Lab Results   Component Value Date    AMYLASE 46 07/05/2023     Lab Results   Component Value Date    LIPASE 133 07/05/2023     Radiology Results:   NM gastric emptying    Result Date: 8/15/2023  Narrative: GASTRIC EMPTYING STUDY INDICATION: R11.0: Nausea COMPARISON:  None available TECHNIQUE:   The study was performed following the oral administration of 1.1 mCi Tc-99m sulfur colloid combined with scrambled eggs, as part of a standard meal.  Following the meal, one minute anterior and posterior images were obtained immediately and at 0.25 hours, 0.5 hour, 1 hour, 1.5 hour, 2 hour, 3 hour and 4 hour intervals from the time of ingestion. Geometric mean analyses were then performed. FINDINGS: Gastric emptying at 0.5 hours = 12% (N < 30%) Gastric emptying at 1 hour = 27% (N = 10 - 70%) Gastric emptying at 2 hours = 49% (N = > 40%) Gastric emptying at 3 hours = 73% (N = > 70%) Gastric emptying at 4 hours = 97% (N = >90%) Linear T-1/2 = 123.26 minutes     Impression: Normal rate of gastric emptying.  Resident: Andie Carroll, the attending radiologist, have reviewed the images and agree with the final report above. Workstation performed: [id number]     US kidney and bladder    Result Date: 8/9/2023  Narrative: ABDOMEN ULTRASOUND, COMPLETE INDICATION:   R31.29: Other microscopic hematuria R11.0: Nausea. COMPARISON:  None TECHNIQUE:   Real-time ultrasound of the abdomen was performed with a curvilinear transducer with both volumetric sweeps and still imaging techniques. FINDINGS: PANCREAS:  Visualized portions of the pancreas are within normal limits. AORTA AND IVC:  Visualized portions are normal for patient age. LIVER: Size:  Within normal range. The liver measures 13.7 cm in the midclavicular line. Contour:  Surface contour is smooth. Parenchyma:  Echogenicity and echotexture are within normal limits. No liver mass identified. Limited imaging of the main portal vein shows it to be patent and hepatopetal. BILIARY: No gallbladder findings. No intrahepatic biliary dilatation. CBD measures 5.0 mm. No choledocholithiasis. KIDNEY: Right kidney measures 12.3 x 4.1 x 4.8  cm. Volume 126.5 mL Kidney within normal limits. Left kidney measures 11.3 x 6.0 x 5.4 cm. Volume 193.4 mL Kidney within normal limits. SPLEEN: Measures 11.0 x 11.0 x 4.2 cm. Volume 267.9 mL Within normal limits. ASCITES:  None. Incidental note of normal bladder. Impression: Normal. Workstation performed: [id number]     US abdomen complete    Result Date: 7/28/2023  Narrative: ABDOMEN ULTRASOUND, COMPLETE INDICATION:   R31.29: Other microscopic hematuria R11.0: Nausea. COMPARISON:  None TECHNIQUE:   Real-time ultrasound of the abdomen was performed with a curvilinear transducer with both volumetric sweeps and still imaging techniques. FINDINGS: PANCREAS:  Visualized portions of the pancreas are within normal limits. AORTA AND IVC:  Visualized portions are normal for patient age. LIVER: Size:  Within normal range. The liver measures 13.7 cm in the midclavicular line. Contour:  Surface contour is smooth.  Parenchyma: Echogenicity and echotexture are within normal limits. No liver mass identified. Limited imaging of the main portal vein shows it to be patent and hepatopetal. BILIARY: No gallbladder findings. No intrahepatic biliary dilatation. CBD measures 5.0 mm. No choledocholithiasis. KIDNEY: Right kidney measures 12.3 x 4.1 x 4.8  cm. Volume 126.5 mL Kidney within normal limits. Left kidney measures 11.3 x 6.0 x 5.4 cm. Volume 193.4 mL Kidney within normal limits. SPLEEN: Measures 11.0 x 11.0 x 4.2 cm. Volume 267.9 mL Within normal limits. ASCITES:  None. Incidental note of normal bladder.      Impression: Normal. Workstation performed: [id number]       Derrick Palma PA-C   Available on [facility]

## 2023-08-22 ENCOUNTER — TELEPHONE (OUTPATIENT)
Dept: GASTROENTEROLOGY | Facility: MEDICAL CENTER | Age: 23
End: 2023-08-22

## 2023-08-23 LAB — TTG IGA SER-ACNC: <2 U/ML (ref 0–3)

## 2023-08-30 RX ORDER — SODIUM CHLORIDE 9 MG/ML
125 INJECTION, SOLUTION INTRAVENOUS CONTINUOUS
Status: CANCELLED | OUTPATIENT
Start: 2023-08-30

## 2023-08-30 RX ORDER — ALBUTEROL SULFATE 2.5 MG/3ML
2.5 SOLUTION RESPIRATORY (INHALATION) ONCE AS NEEDED
Status: CANCELLED | OUTPATIENT
Start: 2023-08-30

## 2023-08-30 RX ORDER — ONDANSETRON 2 MG/ML
4 INJECTION INTRAMUSCULAR; INTRAVENOUS ONCE AS NEEDED
Status: CANCELLED | OUTPATIENT
Start: 2023-08-30

## 2023-08-31 ENCOUNTER — ANESTHESIA EVENT (OUTPATIENT)
Dept: GASTROENTEROLOGY | Facility: MEDICAL CENTER | Age: 23
End: 2023-08-31

## 2023-08-31 ENCOUNTER — HOSPITAL ENCOUNTER (OUTPATIENT)
Dept: GASTROENTEROLOGY | Facility: MEDICAL CENTER | Age: 23
Setting detail: OUTPATIENT SURGERY
End: 2023-08-31
Payer: COMMERCIAL

## 2023-08-31 ENCOUNTER — ANESTHESIA (OUTPATIENT)
Dept: GASTROENTEROLOGY | Facility: MEDICAL CENTER | Age: 23
End: 2023-08-31

## 2023-08-31 VITALS
WEIGHT: 152 LBS | OXYGEN SATURATION: 97 % | BODY MASS INDEX: 24.43 KG/M2 | SYSTOLIC BLOOD PRESSURE: 120 MMHG | DIASTOLIC BLOOD PRESSURE: 62 MMHG | HEIGHT: 66 IN | HEART RATE: 81 BPM | RESPIRATION RATE: 14 BRPM

## 2023-08-31 DIAGNOSIS — R19.4 CHANGE IN BOWEL HABIT: ICD-10-CM

## 2023-08-31 DIAGNOSIS — R63.4 WEIGHT LOSS, UNINTENTIONAL: ICD-10-CM

## 2023-08-31 DIAGNOSIS — R11.0 POSTPRANDIAL NAUSEA: ICD-10-CM

## 2023-08-31 DIAGNOSIS — B80 PINWORM INFECTION: Primary | ICD-10-CM

## 2023-08-31 DIAGNOSIS — R10.84 GENERALIZED ABDOMINAL PAIN: ICD-10-CM

## 2023-08-31 PROCEDURE — 45380 COLONOSCOPY AND BIOPSY: CPT | Performed by: STUDENT IN AN ORGANIZED HEALTH CARE EDUCATION/TRAINING PROGRAM

## 2023-08-31 PROCEDURE — 87209 SMEAR COMPLEX STAIN: CPT | Performed by: STUDENT IN AN ORGANIZED HEALTH CARE EDUCATION/TRAINING PROGRAM

## 2023-08-31 PROCEDURE — 88305 TISSUE EXAM BY PATHOLOGIST: CPT | Performed by: STUDENT IN AN ORGANIZED HEALTH CARE EDUCATION/TRAINING PROGRAM

## 2023-08-31 PROCEDURE — 87177 OVA AND PARASITES SMEARS: CPT | Performed by: STUDENT IN AN ORGANIZED HEALTH CARE EDUCATION/TRAINING PROGRAM

## 2023-08-31 PROCEDURE — 43239 EGD BIOPSY SINGLE/MULTIPLE: CPT | Performed by: STUDENT IN AN ORGANIZED HEALTH CARE EDUCATION/TRAINING PROGRAM

## 2023-08-31 RX ORDER — PROPOFOL 10 MG/ML
INJECTION, EMULSION INTRAVENOUS AS NEEDED
Status: DISCONTINUED | OUTPATIENT
Start: 2023-08-31 | End: 2023-08-31

## 2023-08-31 RX ORDER — ONDANSETRON 2 MG/ML
4 INJECTION INTRAMUSCULAR; INTRAVENOUS ONCE AS NEEDED
Status: DISCONTINUED | OUTPATIENT
Start: 2023-08-31 | End: 2023-09-04 | Stop reason: HOSPADM

## 2023-08-31 RX ORDER — ALBENDAZOLE 200 MG/1
TABLET, FILM COATED ORAL
Qty: 4 TABLET | Refills: 0 | Status: SHIPPED | OUTPATIENT
Start: 2023-08-31 | End: 2023-09-13

## 2023-08-31 RX ORDER — ALBUTEROL SULFATE 2.5 MG/3ML
2.5 SOLUTION RESPIRATORY (INHALATION) ONCE AS NEEDED
Status: DISCONTINUED | OUTPATIENT
Start: 2023-08-31 | End: 2023-08-31

## 2023-08-31 RX ORDER — SODIUM CHLORIDE 9 MG/ML
125 INJECTION, SOLUTION INTRAVENOUS CONTINUOUS
Status: DISCONTINUED | OUTPATIENT
Start: 2023-08-31 | End: 2023-09-04 | Stop reason: HOSPADM

## 2023-08-31 RX ORDER — LIDOCAINE HYDROCHLORIDE 20 MG/ML
INJECTION, SOLUTION EPIDURAL; INFILTRATION; INTRACAUDAL; PERINEURAL AS NEEDED
Status: DISCONTINUED | OUTPATIENT
Start: 2023-08-31 | End: 2023-08-31

## 2023-08-31 RX ADMIN — PROPOFOL 50 MG: 10 INJECTION, EMULSION INTRAVENOUS at 11:03

## 2023-08-31 RX ADMIN — PROPOFOL 50 MG: 10 INJECTION, EMULSION INTRAVENOUS at 11:20

## 2023-08-31 RX ADMIN — PROPOFOL 50 MG: 10 INJECTION, EMULSION INTRAVENOUS at 11:00

## 2023-08-31 RX ADMIN — PROPOFOL 50 MG: 10 INJECTION, EMULSION INTRAVENOUS at 11:12

## 2023-08-31 RX ADMIN — PROPOFOL 50 MG: 10 INJECTION, EMULSION INTRAVENOUS at 11:16

## 2023-08-31 RX ADMIN — LIDOCAINE HYDROCHLORIDE 60 MG: 20 INJECTION, SOLUTION EPIDURAL; INFILTRATION; INTRACAUDAL at 10:56

## 2023-08-31 RX ADMIN — PROPOFOL 50 MG: 10 INJECTION, EMULSION INTRAVENOUS at 11:08

## 2023-08-31 RX ADMIN — PROPOFOL 150 MG: 10 INJECTION, EMULSION INTRAVENOUS at 10:56

## 2023-08-31 RX ADMIN — SODIUM CHLORIDE: 0.9 INJECTION, SOLUTION INTRAVENOUS at 10:54

## 2023-08-31 RX ADMIN — PROPOFOL 50 MG: 10 INJECTION, EMULSION INTRAVENOUS at 11:05

## 2023-08-31 RX ADMIN — PROPOFOL 50 MG: 10 INJECTION, EMULSION INTRAVENOUS at 10:58

## 2023-08-31 RX ADMIN — PROPOFOL 50 MG: 10 INJECTION, EMULSION INTRAVENOUS at 10:57

## 2023-08-31 NOTE — ANESTHESIA PREPROCEDURE EVALUATION
Procedure:  COLONOSCOPY  EGD    Relevant Problems   ANESTHESIA (within normal limits)      /RENAL   (+) Renal cyst      Other   (+) Amplified musculoskeletal pain syndrome        Physical Exam    Airway    Mallampati score: I  TM Distance: >3 FB  Neck ROM: full     Dental       Cardiovascular  Rhythm: regular, Rate: normal,     Pulmonary  Breath sounds clear to auscultation,     Other Findings        Anesthesia Plan  ASA Score- 2     Anesthesia Type- IV sedation with anesthesia with ASA Monitors. Additional Monitors:   Airway Plan:           Plan Factors-Exercise tolerance (METS): >4 METS. Chart reviewed. Patient summary reviewed. Patient is not a current smoker. Induction-     Postoperative Plan-     Informed Consent- Anesthetic plan and risks discussed with patient.

## 2023-08-31 NOTE — ANESTHESIA POSTPROCEDURE EVALUATION
Post-Op Assessment Note    CV Status:  Stable    Pain management: adequate     Mental Status:  Alert and awake   Hydration Status:  Euvolemic   PONV Controlled:  Controlled   Airway Patency:  Patent      Post Op Vitals Reviewed: Yes      Staff: Anesthesiologist         No notable events documented.     BP      Temp      Pulse     Resp      SpO2      /62   Pulse 81   Resp 14   Ht 5' 6" (1.676 m)   Wt 68.9 kg (152 lb)   SpO2 97%   BMI 24.53 kg/m²

## 2023-08-31 NOTE — INTERVAL H&P NOTE
H&P reviewed. After examining the patient I find no changes in the patients condition since the H&P had been written.     Vitals:    08/31/23 1026   BP: 130/88   Pulse: 92   Resp: 18   SpO2: 100%

## 2023-09-05 PROCEDURE — 88305 TISSUE EXAM BY PATHOLOGIST: CPT | Performed by: STUDENT IN AN ORGANIZED HEALTH CARE EDUCATION/TRAINING PROGRAM

## 2023-09-07 NOTE — RESULT ENCOUNTER NOTE
Patient updated by [facility]. Esophagitis. Negative H.pylori. No worms were found on O&P sample or the colon biopsies. I called [facility] who will re-run the O&P, and they have noted down that numerous worms were visualized. I also spoke with the pathologist Dr. Strong Friday who will take another look at the specimen. He states that perhaps the worms are still in the formalin. 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

## 2023-09-21 ENCOUNTER — TELEPHONE (OUTPATIENT)
Dept: GASTROENTEROLOGY | Facility: CLINIC | Age: 23
End: 2023-09-21

## 2023-09-21 ENCOUNTER — TELEPHONE (OUTPATIENT)
Age: 23
End: 2023-09-21

## 2023-09-21 ENCOUNTER — OFFICE VISIT (OUTPATIENT)
Dept: GASTROENTEROLOGY | Facility: CLINIC | Age: 23
End: 2023-09-21
Payer: COMMERCIAL

## 2023-09-21 VITALS
SYSTOLIC BLOOD PRESSURE: 122 MMHG | HEIGHT: 66 IN | TEMPERATURE: 97.8 F | BODY MASS INDEX: 23.78 KG/M2 | WEIGHT: 148 LBS | DIASTOLIC BLOOD PRESSURE: 74 MMHG

## 2023-09-21 DIAGNOSIS — B80 PINWORM INFECTION: ICD-10-CM

## 2023-09-21 DIAGNOSIS — K21.00 GASTROESOPHAGEAL REFLUX DISEASE WITH ESOPHAGITIS WITHOUT HEMORRHAGE: Primary | ICD-10-CM

## 2023-09-21 PROCEDURE — 99213 OFFICE O/P EST LOW 20 MIN: CPT | Performed by: PHYSICIAN ASSISTANT

## 2023-09-21 RX ORDER — PANTOPRAZOLE SODIUM 40 MG/1
40 TABLET, DELAYED RELEASE ORAL
Qty: 60 TABLET | Refills: 3 | Status: SHIPPED | OUTPATIENT
Start: 2023-09-21

## 2023-09-21 NOTE — PROGRESS NOTES
[facility] - Outpatient Follow-up Note  Shantelle Iyer 26 y.o. female MRN: [id number]  Encounter: [id number]  ASSESSMENT AND PLAN:    1. Gastroesophageal reflux disease with esophagitis without hemorrhage  We reviewed her recent EGD results and pathology. Patient expressed understanding to results. She is overall feeling much better since last visit. At this time I recommended that she continue pantoprazole 40 mg before breakfast, will add additional pantoprazole 40 mg before dinner. She will continue famotidine 20 mg at bedtime. We discussed and I recommended GERD diet and lifestyle, I educated patient on GERD at length. We will plan repeat EGD in about 2 months to assess for esophagitis healing    - EGD; Future  - pantoprazole (PROTONIX) 40 mg tablet; Take 1 tablet (40 mg total) by mouth 2 (two) times a day before meals  Dispense: 60 tablet; Refill: 3    2. Pinworm infection  We reviewed her colonoscopy results at length. Patient expressed understanding to results. She is feeling better status post empiric treatment with albendazole 400 mg x 2. She offers no new complaints or alarm symptoms. We will continue to monitor patient. Patient was instructed to call the office with any questions, concerns, new/ worsening/ persisting GI symptoms. Advised patient go to the ER with any severe or worsening abdominal pain, fevers/ chills, intractable N/V, chest pain, SOB, dizziness, lightheadedness, feeling something stuck in esophagus that will not go down. Patient expressed understanding and is in agreement with treatment plan. Will plan to follow up after diagnostic tests   __________________________________________________________    SUBJECTIVE:    Patient presents for follow-up today patient was last seen by me 1 month ago for postprandial nausea, generalized abdominal pain, weight loss, change in bowels.   At last visit I recommended she continue pantoprazole 40 mg in the morning prior to breakfast and we added famotidine 40 mg once daily at bedtime. We ordered EGD and colonoscopy as well as blood work. CRP was slightly elevated at level 7.5  Celiac testing was negative  EGD 8/31/2023  showed LA grade C esophagitis, 1 cm hiatal hernia otherwise normal.  Distal esophageal biopsies showed mild chronic inflammation. Gastric biopsies negative for H. pylori. Colonoscopy 8/31/2023 was completely normal aside from worms seen throughout entire colon. Was recommended to  empirically treat with albendazole 400 mg once, then repeat in 2 weeks for suspected pinworm infection. Random colon biopsies were normal. Pathology of warm specimens seen during procedure was unable to be assessed. Patient reports feeling better overall. No new complaints or concerns today. No longer with nausea. No longer with abdominal pain. No longer with coughing or dry heaving. She has been able to eat her meals more completely. No longer with occasional diarrhea. She is very pleased  BMs are normal, regular, formed and brown. Patient denies any current or recent fevers/ chills, unintentional weight loss, decreased appetite, abdominal pain, nausea, vomiting, change in bowel habits, diarrhea, constipation, black or bloody stools, heartburn, dysphagia, odynophagia. Denies chest pain, SOB, skin rashes, joint pain/ swelling. She states she saw an eye doctor yesterday, diagnosed with an eye disorder and going to get a brain MRI in a few weeks    Review of Systems   Constitutional: Negative for chills and fever. HENT: Negative for ear pain and sore throat. Eyes: Negative for pain and visual disturbance. Respiratory: Negative for cough and shortness of breath. Cardiovascular: Negative for chest pain and palpitations. Gastrointestinal: Negative for constipation, diarrhea and vomiting. Genitourinary: Negative for dysuria and hematuria. Musculoskeletal: Negative for arthralgias and back pain. Skin: Negative for color change and rash. Neurological: Negative for seizures and syncope. All other systems reviewed and are negative.      Historical Information   Past Medical History:   Diagnosis Date   • Amplified musculoskeletal pain syndrome      Past Surgical History:   Procedure Laterality Date   • COLONOSCOPY     • UPPER GASTROINTESTINAL ENDOSCOPY       Social History   Social History     Substance and Sexual Activity   Alcohol Use Never     Social History     Substance and Sexual Activity   Drug Use Never     Social History     Tobacco Use   Smoking Status Never   Smokeless Tobacco Never     Family History   Problem Relation Age of Onset   • Depression Mother    • Mental illness Mother    • ADD / ADHD Mother    • Anxiety disorder Mother    • Bipolar disorder Mother    • OCD Mother    • Mental illness Father    • Cancer Maternal Grandfather    • Mental illness Maternal Grandmother    • Coronary artery disease Maternal Grandmother    • Prostate cancer Paternal Grandfather    • Cancer Paternal Grandfather    • Dementia Paternal Grandmother    • Breast cancer Paternal Grandmother    • Substance Abuse Maternal Uncle    • Depression Maternal Uncle    • Prostate cancer Maternal Uncle    • Drug abuse Maternal Uncle    • Heart defect Maternal Grandmother        Meds/Allergies       Current Outpatient Medications:   •  famotidine (PEPCID) 20 mg tablet  •  Deja FE 1.5/30 1.5-30 MG-MCG tablet  •  pantoprazole (PROTONIX) 40 mg tablet    No Known Allergies        Objective     Wt Readings from Last 3 Encounters:   09/21/23 67.1 kg (148 lb)   08/31/23 68.9 kg (152 lb)   08/21/23 69.3 kg (152 lb 12.8 oz)     Temp Readings from Last 3 Encounters:   09/21/23 97.8 °F (36.6 °C) (Tympanic)   08/21/23 (!) 97.4 °F (36.3 °C) (Tympanic)   07/12/23 (!) 97.4 °F (36.3 °C) (Temporal)     BP Readings from Last 3 Encounters:   09/21/23 122/74   08/31/23 120/62   08/21/23 112/64     Pulse Readings from Last 3 Encounters: 08/31/23 81   07/12/23 87   07/05/23 93          PHYSICAL EXAM:      Physical Exam  Vitals reviewed. Constitutional:       General: She is not in acute distress. Appearance: She is not toxic-appearing. HENT:      Head: Normocephalic and atraumatic. Eyes:      Extraocular Movements: Extraocular movements intact. Conjunctiva/sclera: Conjunctivae normal.   Cardiovascular:      Rate and Rhythm: Normal rate and regular rhythm. Pulmonary:      Effort: Pulmonary effort is normal. No respiratory distress. Breath sounds: Normal breath sounds. Abdominal:      General: Bowel sounds are normal.      Palpations: Abdomen is soft. Tenderness: There is no abdominal tenderness. Musculoskeletal:         General: No swelling or tenderness. Cervical back: Normal range of motion and neck supple. Skin:     General: Skin is warm and dry. Coloration: Skin is not jaundiced. Neurological:      General: No focal deficit present. Mental Status: She is alert and oriented to person, place, and time. Mental status is at baseline. Psychiatric:         Mood and Affect: Mood normal.         Behavior: Behavior normal.         Thought Content: Thought content normal.          Lab Results:   No visits with results within 1 Day(s) from this visit.    Latest known visit with results is:   Hospital Outpatient Visit on 08/31/2023   Component Date Value   • Case Report 08/31/2023                      Value:Surgical Pathology Report                         Case: [id number]                                   Authorizing Provider:  Dodie Alexander MD       Collected:           08/31/2023 1101              Ordering Location:     [facility]        Received:            08/31/2023 [facility]                                                     Pathologist:           Dieter Nash MD Specimens:   A) - Stomach, bx's r/o H pylori                                                                     B) - Esophagus, lower esophagus erosion bx's r/o CMV/HSV/Candida                                    C) - Colon, bx's r/o pinworm                                                                        D) - Colon, random colon bx's                                                             • Addendum 08/31/2023                      Value: This result contains rich text formatting which cannot be displayed here. • Final Diagnosis 08/31/2023                      Value: This result contains rich text formatting which cannot be displayed here. • Additional Information 08/31/2023                      Value: This result contains rich text formatting which cannot be displayed here. • Gross Description 08/31/2023                      Value: This result contains rich text formatting which cannot be displayed here. • Clinical Information 08/31/2023                      Value:INDICATION:  Generalized abdominal pain, Weight loss, unintentional, Postprandial nausea  FINDINGS:  · Grade C esophagitis with mucosal breaks measuring 5 mm or more, continuous between folds, covering less than 75% of the circumference in the lower third of the esophagus and GE junction; performed cold forceps biopsy. There were multiple small, round erosions in the lower esophagus with minimal white exudate, not quite typical of reflux esophagitis. Will ask to rule out viral inclusions and Candida  · 1 cm hiatal hernia without Donneta Au lesions present - GE junction 36 cm from the incisors, diaphragmatic impression 37 cm from the incisors:  Hill classification: Grade II  · The body of the stomach and antrum appeared normal. Performed random biopsy using biopsy forceps to rule out H. pylori.   · The duodenal bulb and 2nd part of the duodenum appeared normal. Not biopsied given normal celiac serologies  · Numerous white worms throughout the colon with the highest burden in the ascending                           colon and cecum. Worms and stool were suctioned and sent for O&P evaluation. · The terminal ileum appeared normal. Worms were seen in the TI  · The entire colon appeared normal. Performed random biopsy using biopsy forceps. Random biopsies to evaluate for colonic inflammation. Worms were also collected as part of these biopsies for evaluation       Lab Results   Component Value Date    WBC 5.97 07/05/2023    HGB 14.5 07/05/2023    HCT 41.4 07/05/2023    MCV 92 07/05/2023     07/05/2023       Lab Results   Component Value Date    SODIUM 138 07/05/2023    K 3.8 07/05/2023     07/05/2023    CO2 25 07/05/2023    AGAP 7 07/05/2023    BUN 6 07/05/2023    CREATININE 0.74 07/05/2023    GLUC 95 07/05/2023    CALCIUM 9.3 07/05/2023    AST 15 07/05/2023    ALT 25 07/05/2023    ALKPHOS 59 07/05/2023    TP 7.9 07/05/2023    TBILI 0.64 07/05/2023    EGFR 115 07/05/2023     Lab Results   Component Value Date    CRP 7.5 (H) 08/21/2023         Radiology Results:   EGD    Addendum Date: 8/31/2023 Addendum:   [facility] [address] [phone] DATE OF SERVICE: 8/31/23 PHYSICIAN(S): Attending: Claire Boyle MD Fellow: No Staff Documented INDICATION: Generalized abdominal pain, Weight loss, unintentional, Postprandial nausea POST-OP DIAGNOSIS: See the impression below. PREPROCEDURE: Informed consent was obtained for the procedure, including sedation. Risks of perforation, hemorrhage, adverse drug reaction and aspiration were discussed. The patient was placed in the left lateral decubitus position. Patient was explained about the risks and benefits of the procedure. Risks including but not limited to bleeding, infection, and perforation were explained in detail. Also explained about less than 100% sensitivity with the exam and other alternatives.  PROCEDURE: EGD DETAILS OF PROCEDURE: Patient was taken to the procedure room where a time out was performed to confirm correct patient and correct procedure. The patient underwent monitored anesthesia care, which was administered by an anesthesia professional. The patient's blood pressure, heart rate, level of consciousness, respirations and oxygen were monitored throughout the procedure. The scope was advanced to the second part of the duodenum. Retroflexion was performed in the fundus. The patient experienced no blood loss. The procedure was not difficult. The patient tolerated the procedure well. There were no apparent adverse events. ANESTHESIA INFORMATION: ASA: II Anesthesia Type: IV Sedation with Anesthesia MEDICATIONS: No administrations occurring from 1053 to 1106 on 08/31/23 FINDINGS: Grade C esophagitis with mucosal breaks measuring 5 mm or more, continuous between folds, covering less than 75% of the circumference in the lower third of the esophagus and GE junction; performed cold forceps biopsy. There were multiple small, round erosions in the lower esophagus with minimal white exudate, not quite typical of reflux esophagitis. Will ask to rule out viral inclusions and Candida 1 cm hiatal hernia without Alisia Colla lesions present - GE junction 36 cm from the incisors, diaphragmatic impression 37 cm from the incisors:  Hill classification: Grade II The body of the stomach and antrum appeared normal. Performed random biopsy using biopsy forceps to rule out H. pylori.  The duodenal bulb and 2nd part of the duodenum appeared normal. Not biopsied given normal celiac serologies SPECIMENS: ID Type Source Tests Collected by Time Destination 1 : bx's r/o H pylori Tissue Stomach TISSUE EXAM Leanne Bryant MD 8/31/2023 11:01 AM  2 : lower esophagus erosion bx's r/o CMV/HSV Tissue Esophagus TISSUE EXAM Leanne Bryant MD 8/31/2023 11:03 AM  IMPRESSION: Grade C esophagitis in the lower third of the esophagus and GE junction; performed cold forceps biopsy 1 cm hiatal hernia The body of the stomach and antrum appeared normal. Performed random biopsy to rule out H. pylori. The duodenal bulb and 2nd part of the duodenum appeared normal. RECOMMENDATION:  Await pathology results  Resume pantoprazole    David Cedillo MD     Result Date: 8/31/2023  Narrative: Table formatting from the original result was not included. [facility] [facility] [address] [facility],[address] Floors [phone] DATE OF SERVICE: 8/31/23 PHYSICIAN(S): Attending: David Cedillo MD Fellow: No Staff Documented INDICATION: Generalized abdominal pain, Weight loss, unintentional, Postprandial nausea POST-OP DIAGNOSIS: See the impression below. PREPROCEDURE: Informed consent was obtained for the procedure, including sedation. Risks of perforation, hemorrhage, adverse drug reaction and aspiration were discussed. The patient was placed in the left lateral decubitus position. Patient was explained about the risks and benefits of the procedure. Risks including but not limited to bleeding, infection, and perforation were explained in detail. Also explained about less than 100% sensitivity with the exam and other alternatives. PROCEDURE: EGD DETAILS OF PROCEDURE: Patient was taken to the procedure room where a time out was performed to confirm correct patient and correct procedure. The patient underwent monitored anesthesia care, which was administered by an anesthesia professional. The patient's blood pressure, heart rate, level of consciousness, respirations and oxygen were monitored throughout the procedure. The scope was advanced to the second part of the duodenum. Retroflexion was performed in the fundus. The patient experienced no blood loss. The procedure was not difficult. The patient tolerated the procedure well. There were no apparent adverse events.  ANESTHESIA INFORMATION: ASA: II Anesthesia Type: IV Sedation with Anesthesia MEDICATIONS: No administrations occurring from 1053 to 1106 on 08/31/23 FINDINGS: Grade C esophagitis with mucosal breaks measuring 5 mm or more, continuous between folds, covering less than 75% of the circumference in the lower third of the esophagus and GE junction; performed cold forceps biopsy. There were multiple small, round erosions in the lower esophagus with minimal exudate, not quite typical of reflux esophagitis. 1 cm hiatal hernia without Sushil lesions present - GE junction 36 cm from the incisors, diaphragmatic impression 37 cm from the incisors:  Hill classification: Grade II The body of the stomach and antrum appeared normal. Performed random biopsy using biopsy forceps to rule out H. pylori. The duodenal bulb and 2nd part of the duodenum appeared normal. Not biopsied given normal celiac serologies SPECIMENS: ID Type Source Tests Collected by Time Destination 1 : bx's r/o H pylori Tissue Stomach TISSUE EXAM Dodie Alexander MD 8/31/2023 11:01 AM  2 : lower esophagus erosion bx's r/o CMV/HSV Tissue Esophagus TISSUE EXAM Dodie Alexander MD 8/31/2023 11:03 AM      Impression: Grade C esophagitis in the lower third of the esophagus and GE junction; performed cold forceps biopsy 1 cm hiatal hernia The body of the stomach and antrum appeared normal. Performed random biopsy to rule out H. pylori. The duodenal bulb and 2nd part of the duodenum appeared normal. RECOMMENDATION:  Await pathology results  Resume pantoprazole    Dodie Alexander MD     Colonoscopy    Result Date: 8/31/2023  Narrative: Table formatting from the original result was not included. [address] [facility] [address] [facility],[address] Floors [phone] DATE OF SERVICE: 8/31/23 PHYSICIAN(S): Attending: Dodie Alexander MD Fellow: No Staff Documented INDICATION: Generalized abdominal pain, Weight loss, unintentional, Postprandial nausea, Change in bowel habit POST-OP DIAGNOSIS: See the impression below. HISTORY: Prior colonoscopy: No prior colonoscopy.  BOWEL PREPARATION: Golytely/Colyte/Trilyte PREPROCEDURE: Informed consent was obtained for the procedure, including sedation. Risks including but not limited to bleeding, infection, perforation, adverse drug reaction and aspiration were explained in detail. Also explained about less than 100% sensitivity with the exam and other alternatives. The patient was placed in the left lateral decubitus position. Procedure: Colonoscopy DETAILS OF PROCEDURE: Patient was taken to the procedure room where a time out was performed to confirm correct patient and correct procedure. The patient underwent monitored anesthesia care, which was administered by an anesthesia professional. The patient's blood pressure, heart rate, level of consciousness, oxygen, respirations and ECG were monitored throughout the procedure. A digital rectal exam was performed. The scope was introduced through the anus and advanced to the terminal ileum. Retroflexion was performed in the rectum. The quality of bowel preparation was evaluated using the [facility] Bowel Preparation Scale with scores of: right colon = 2, transverse colon = 2, left colon = 2. The total BBPS score was 6. Bowel prep was adequate. The patient experienced no blood loss. The procedure was not difficult. The patient tolerated the procedure well. There were no apparent adverse events. ANESTHESIA INFORMATION: ASA: II Anesthesia Type: IV Sedation with Anesthesia MEDICATIONS: No administrations occurring from 1053 to 1137 on 08/31/23 FINDINGS: Numerous white worms throughout the colon with the highest burden in the ascending colon and cecum. Worms and stool were suctioned and sent for O&P evaluation. The terminal ileum appeared normal. Worms were seen in the TI The entire colon appeared normal. Performed random biopsy using biopsy forceps. Random biopsies to evaluate for colonic inflammation.  Worms were also collected as part of these biopsies for evaluation EVENTS: Procedure Events Event Event Time ENDO CECUM REACHED 8/31/2023 11:20 AM ENDO SCOPE OUT TIME 8/31/2023 11:34 AM SPECIMENS: ID Type Source Tests Collected by Time Destination 1 : bx's r/o H pylori Tissue Stomach TISSUE EXAM Jesus Antoine MD 8/31/2023 11:01 AM  2 : lower esophagus erosion bx's r/o CMV/HSV/Candida Tissue Esophagus TISSUE EXAM Jesus Antoine MD 8/31/2023 11:03 AM  3 : bx's r/o pinworm Tissue Colon TISSUE EXAM Jesus Antoine MD 8/31/2023 11:20 AM  4 : random colon bx's Tissue Colon TISSUE EXAM Jesus Antoine MD 8/31/2023 11:31 AM  A : r/o pinworms Stool Colon OVA AND PARASITE EXAMINATION Jesus Antoine MD 8/31/2023 11:32 AM  EQUIPMENT: Colonoscope -     Impression: The terminal ileum appeared normal. The entire colon appeared normal. Performed random biopsy using biopsy forceps. RECOMMENDATION:  No further screening colonoscopies necessary  Not recommended at this time due to age (patient below screening age). Plan for age-appropriate colorectal cancer screening. Await pathology results  Suspect GI symptoms are related to severe pinworm infection. Will empirically treat with albendazole 400 mg once, then repeat in 2 weeks.     MD Luis Enrique Hidalgo PA-C   Available on [facility]

## 2023-09-21 NOTE — PATIENT INSTRUCTIONS
GERD (Gastroesophageal Reflux Disease)   AMBULATORY CARE:   Gastroesophageal reflux disease (GERD)  is reflux that happens more than 2 times a week for a few weeks. Reflux means acid and food in your stomach back up into your esophagus. GERD can cause other health problems over time if it is not treated. Common causes of GERD:  GERD often happens because the lower muscle (sphincter) of the esophagus does not close properly. The sphincter normally opens to let food into the stomach. It then closes to keep food and stomach acid in the stomach. If the sphincter does not close properly, stomach acid and food back up (reflux) into the esophagus. The following may increase your risk for GERD:  Certain foods such as spicy foods, chocolate, foods that contain caffeine, peppermint, and fried foods    Hiatal hernia    Certain medicines such as calcium channel blockers (used to treat high blood pressure), allergy medicines, sedatives, or antidepressants    Pregnancy, obesity, or scleroderma    Lying down after a meal    Drinking alcohol or smoking cigarettes    Signs and symptoms:   Heartburn (burning pain in your chest)    Pain after meals that spreads to your neck, jaw, or shoulder    Pain that gets better when you change positions    Bitter or acid taste in your mouth    A dry cough    Trouble swallowing or pain with swallowing    Hoarseness or a sore throat    Burping or hiccups    Feeling full soon after you start eating    Call your local emergency number (911 in the [address]) if:   You have severe chest pain and sudden trouble breathing. Seek care immediately if:   You have trouble breathing after you vomit. You have trouble swallowing, or pain with swallowing. Your bowel movements are black, bloody, or tarry-looking. Your vomit looks like coffee grounds or has blood in it. Call your doctor or gastroenterologist if:   You feel full and cannot burp or vomit.     You vomit large amounts, or you vomit often.    You are losing weight without trying. Your symptoms get worse or do not improve with treatment. You have questions or concerns about your condition or care. Treatment for GERD:   Medicines  are used to decrease stomach acid. Medicine may also be used to help your lower esophageal sphincter and stomach contract (tighten) more. Surgery  is done to wrap the upper part of the stomach around the esophageal sphincter. This will strengthen the sphincter and prevent reflux. Manage GERD:       Do not have foods or drinks that may increase heartburn. These include chocolate, peppermint, fried or fatty foods, drinks that contain caffeine, or carbonated drinks (soda). Other foods include spicy foods, onions, tomatoes, and tomato-based foods. Do not have foods or drinks that can irritate your esophagus, such as citrus fruits, juices, and alcohol. Do not eat large meals. When you eat a lot of food at one time, your stomach needs more acid to digest it. Eat 6 small meals each day instead of 3 large meals, and eat slowly. Do not eat meals 2 to 3 hours before bedtime. Elevate the head of your bed. Place 6-inch blocks under the head of your bed frame. You may also use more than one pillow under your head and shoulders while you sleep. Maintain a healthy weight. If you are overweight, weight loss may help relieve symptoms of GERD. Do not smoke. Smoking weakens the lower esophageal sphincter and increases the risk of GERD. Ask your healthcare provider for information if you currently smoke and need help to quit. E-cigarettes or smokeless tobacco still contain nicotine. Talk to your healthcare provider before you use these products. Do not put pressure on your abdomen. Pressure pushes acid up into your esophagus. Do not wear clothing that is tight around your waist. Do not bend over. Bend at the knees if you need to pick something up.   Follow up with your doctor or gastroenterologist as directed:  Write down your questions so you remember to ask them during your visits. © Copyright Blaire Bush 2023 Information is for End User's use only and may not be sold, redistributed or otherwise used for commercial purposes. The above information is an  only. It is not intended as medical advice for individual conditions or treatments. Talk to your doctor, nurse or pharmacist before following any medical regimen to see if it is safe and effective for you.       Scheduled date of EGD(as of today): 11/27/23  Physician performing EGD: Dr. Harmony Tatum  Location of EGD: [facility]  Instructions reviewed with patient by: Margeret Carrel  Clearances:  N/A

## 2023-09-25 ENCOUNTER — TELEPHONE (OUTPATIENT)
Age: 23
End: 2023-09-25

## 2023-10-04 ENCOUNTER — HOSPITAL ENCOUNTER (OUTPATIENT)
Facility: MEDICAL CENTER | Age: 23
Discharge: HOME | End: 2023-10-04
Payer: COMMERCIAL

## 2023-10-04 DIAGNOSIS — H50.011 MONOCULAR ESOTROPIA, RIGHT EYE: ICD-10-CM

## 2023-10-04 PROCEDURE — 70553 MRI BRAIN STEM W/O & W/DYE: CPT

## 2023-10-04 PROCEDURE — 70543 MRI ORBT/FAC/NCK W/O &W/DYE: CPT

## 2023-10-04 PROCEDURE — A9585 GADOBUTROL INJECTION: HCPCS | Performed by: RADIOLOGY

## 2023-10-04 RX ORDER — GADOBUTROL 604.72 MG/ML
6 INJECTION INTRAVENOUS
Status: COMPLETED | OUTPATIENT
Start: 2023-10-04 | End: 2023-10-04

## 2023-10-04 RX ADMIN — GADOBUTROL 6 ML: 604.72 INJECTION INTRAVENOUS at 13:11

## 2023-10-09 ENCOUNTER — OFFICE VISIT (OUTPATIENT)
Dept: FAMILY MEDICINE CLINIC | Facility: CLINIC | Age: 23
End: 2023-10-09
Payer: COMMERCIAL

## 2023-10-09 VITALS
TEMPERATURE: 97.4 F | HEART RATE: 101 BPM | OXYGEN SATURATION: 100 % | HEIGHT: 66 IN | BODY MASS INDEX: 24.08 KG/M2 | DIASTOLIC BLOOD PRESSURE: 64 MMHG | WEIGHT: 149.8 LBS | SYSTOLIC BLOOD PRESSURE: 126 MMHG

## 2023-10-09 DIAGNOSIS — K21.00 GASTROESOPHAGEAL REFLUX DISEASE WITH ESOPHAGITIS WITHOUT HEMORRHAGE: ICD-10-CM

## 2023-10-09 DIAGNOSIS — B80 PINWORM INFECTION: ICD-10-CM

## 2023-10-09 DIAGNOSIS — E23.6 RATHKE'S CLEFT CYST (HCC): Primary | ICD-10-CM

## 2023-10-09 PROCEDURE — 99214 OFFICE O/P EST MOD 30 MIN: CPT | Performed by: FAMILY MEDICINE

## 2023-10-09 NOTE — PROGRESS NOTES
Assessment/Plan:    No problem-specific Assessment & Plan notes found for this encounter. 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 Discussed referral to endocrinology for hormonal testing to assess for hypo/hypersecreation of hormones. Discussed that if it is enlarging then she will likely need to be evaluated by Neurosurgery for removal.   - Pinwork infection: Noted of recent colonoscopy, treated with course of Albendazole treatment   - GERD with esophagitis without hemorrhage: Continue management per Gastroenterology with Protonix 40mg BID and Pepcid 20mg daily    Subjective:      Patient ID: Cornelio Mcadams is a 26 y.o. female. HPI     Cornelio Mcadams is a pleasant 26year old female who presents today for a follow up and to discuss results of her recent MRI. Patient was recently seen by ophthalmology due to monocular esotropia and had an MRI was subsequently ordered. MRI showed a 6 mm posterior sellar lesion likely a Rathke cleft cyst. A short term follow with contrast enhanced pituitary MRI was recommended. With regards to her Gastrointestinal issues she had a colonoscopy which showed pinworms and EGD also showed gastritis. She was treated with a course of Albendazole with complete resolution of symptoms. She is also on Protonix 40mg BID and Pepcid as well as reflux. The following portions of the patient's history were reviewed and updated as appropriate: allergies, current medications, past family history, past medical history, past social history, past surgical history and problem list.    Review of Systems   Constitutional: Negative. HENT: Negative.     Eyes: Positive for visual disturbance. Respiratory: Negative. Cardiovascular: Negative. Gastrointestinal: Negative. Genitourinary: Negative. Musculoskeletal: Negative. Skin: Negative. Neurological: Negative. Psychiatric/Behavioral: Negative. Objective:      /64 (BP Location: Left arm, Patient Position: Sitting, Cuff Size: Standard)   Pulse 101   Temp (!) 97.4 °F (36.3 °C) (Temporal)   Ht 5' 6" (1.676 m)   Wt 67.9 kg (149 lb 12.8 oz)   SpO2 100%   BMI 24.18 kg/m²          Physical Exam  Constitutional:       General: She is not in acute distress. Appearance: She is not ill-appearing. HENT:      Head: Normocephalic and atraumatic. Eyes:      General:         Right eye: No discharge. Left eye: No discharge. Extraocular Movements: Extraocular movements intact. Pupils: Pupils are equal, round, and reactive to light. Cardiovascular:      Rate and Rhythm: Normal rate. Pulmonary:      Effort: Pulmonary effort is normal. No respiratory distress. Breath sounds: No wheezing. Abdominal:      General: There is no distension. Palpations: Abdomen is soft. Tenderness: There is no abdominal tenderness. There is no guarding. Neurological:      General: No focal deficit present. Mental Status: She is alert.    Psychiatric:         Mood and Affect: Mood normal.         Behavior: Behavior normal.

## 2023-10-16 DIAGNOSIS — K21.00 GASTROESOPHAGEAL REFLUX DISEASE WITH ESOPHAGITIS WITHOUT HEMORRHAGE: ICD-10-CM

## 2023-10-16 RX ORDER — PANTOPRAZOLE SODIUM 40 MG/1
40 TABLET, DELAYED RELEASE ORAL
Qty: 60 TABLET | Refills: 3 | Status: SHIPPED | OUTPATIENT
Start: 2023-10-16

## 2023-10-27 DIAGNOSIS — R11.0 POSTPRANDIAL NAUSEA: ICD-10-CM

## 2023-10-27 DIAGNOSIS — R10.84 GENERALIZED ABDOMINAL PAIN: ICD-10-CM

## 2023-10-27 RX ORDER — FAMOTIDINE 40 MG/1
40 TABLET, FILM COATED ORAL
Qty: 30 TABLET | Refills: 2 | Status: SHIPPED | OUTPATIENT
Start: 2023-10-27 | End: 2023-12-01

## 2023-11-08 ENCOUNTER — ANESTHESIA (OUTPATIENT)
Dept: ANESTHESIOLOGY | Facility: HOSPITAL | Age: 23
End: 2023-11-08

## 2023-11-08 ENCOUNTER — ANESTHESIA EVENT (OUTPATIENT)
Dept: ANESTHESIOLOGY | Facility: HOSPITAL | Age: 23
End: 2023-11-08

## 2023-11-14 ENCOUNTER — CONSULT (OUTPATIENT)
Dept: ENDOCRINOLOGY | Facility: CLINIC | Age: 23
End: 2023-11-14
Payer: COMMERCIAL

## 2023-11-14 VITALS
DIASTOLIC BLOOD PRESSURE: 84 MMHG | HEIGHT: 65 IN | WEIGHT: 147.2 LBS | BODY MASS INDEX: 24.53 KG/M2 | HEART RATE: 73 BPM | SYSTOLIC BLOOD PRESSURE: 140 MMHG

## 2023-11-14 DIAGNOSIS — E23.6 RATHKE'S CLEFT CYST (HCC): ICD-10-CM

## 2023-11-14 DIAGNOSIS — R63.4 WEIGHT LOSS: Primary | ICD-10-CM

## 2023-11-14 PROCEDURE — 99244 OFF/OP CNSLTJ NEW/EST MOD 40: CPT | Performed by: STUDENT IN AN ORGANIZED HEALTH CARE EDUCATION/TRAINING PROGRAM

## 2023-11-14 NOTE — PATIENT INSTRUCTIONS
- Please have the lab work and MRI of the pituitary gland done in early January 2024 and follow-up in late January  - Have visual field testing done by your ophthalmologist and let us know at the next visit  - Please go to the ED if you have sudden worsening headaches or loss of vision

## 2023-11-14 NOTE — PROGRESS NOTES
Anita Garcia   26 y.o. Female MRN: [id number]  Encounter: [id number]    New Patient Consult Note    CC: Rathke Cleft Cyst    ASSESSMENT AND PLAN  Assessment:  Anita Garcia is a 81-year-old female with an incidental Rathke cleft cyst discovered on brain MRI. Plan:  Rathke cleft cyst  - Need to exclude hypopituitarism due to mass effect, but patient is on OCPs that can interfere with testing of gonadotropins, IGF-1, and ACTH so will defer these tests for the time being  - Obtain TSH with free T4 to rule out secondary hypothyroidism  - Repeat MRI of the pituitary gland w/wo contrast to better visualize lesion in early January   - She was advised to have visual field testing done by her ophthalmologist and let this office know of the results   - If patient experiences any severe headaches or visual field changes, she should go to the ER immediately for further evaluation      HISTORY OF PRESENT ILLNESS  HPI:  Anita Garcia is a 81-year-old female with a past medical history significant for GERD with esophagitis and monocular esotropia who is referred by her PCP, Dr. Maggie Thomas, for evaluation and management of a Rathke's cyst which was incidentally found on her MRI brain and orbits. She was ordered an MRI of the brain and orbits by her ophthalmologist for evaluation of monocular esotropia and completed this on 10/4/23. This showed a 6 mm posterior sellar T1 hyperintense lesion/structure possibly representing a Rathke cleft cyst. She denies any previous brain MRIs. She was diagnosed with monocular esotropia earlier this year and noticed that this has been worsening for the past few months with continuously blurry vision in the right eye particularly with long distances. She denies any acute loss of vision however.     She endorses recent posterior headaches that are pressure-like in nature lasting 2 to 3 days, double vision, weakness, nausea, generalized abdominal pain, dizziness, loss of appetite over the past year now able to tolerate food slightly better, weight loss of 30 pounds since May 2023 due to poor appetite, difficulty falling asleep with daytime somnolence which is more frequent in nature compared to before, fatigue associated with malaise, irritability, increased sweating particularly in the axillae, and decreased muscle mass attributed to lack of physical activity. The symptoms have been ongoing for the past few months. She also endorses nausea which she attributes to GERD and has been improving with famotidine. She denies any galactorrhea, salt cravings, hair loss, dry skin brittle nails, loss of libido, irregular menstrual cycles, or myalgias. FDLMP was 10/23/2023. She is taking OCP since late 2020 for contraception. Family history is significant for a maternal grand-aunt and maternal great grandfather with type 2 diabetes and maternal great grandfather with a brain aneurysm following surgical resection of possible brain tumor. She denies tobacco, alcohol, or illicit drug use. She denies any use of over-the-counter herbal supplements. She recently graduated college and is currently looking for work with plans to pursue a masters degree. Review of Systems   Constitutional:  Positive for activity change, appetite change, fatigue and unexpected weight change (-30 lbs since May 2023). Negative for chills and fever. HENT:  Negative for ear pain and sore throat. Eyes:  Positive for visual disturbance (Blurry vision). Negative for pain. Respiratory:  Negative for cough and shortness of breath. Cardiovascular:  Negative for chest pain and palpitations. Gastrointestinal:  Positive for abdominal pain. Negative for constipation, nausea and vomiting. Endocrine: Negative for cold intolerance, heat intolerance, polydipsia and polyuria. Genitourinary:  Negative for dysuria, hematuria and menstrual problem. Musculoskeletal:  Negative for arthralgias, back pain and myalgias. Skin:  Negative for color change and rash. Neurological:  Positive for dizziness, light-headedness and numbness. Negative for tremors, seizures and syncope. Psychiatric/Behavioral:  Positive for dysphoric mood and sleep disturbance. All other systems reviewed and are negative. Historical Information   Past Medical History:   Diagnosis Date    Amplified musculoskeletal pain syndrome      Past Surgical History:   Procedure Laterality Date    COLONOSCOPY      UPPER GASTROINTESTINAL ENDOSCOPY       Social History   Social History     Substance and Sexual Activity   Alcohol Use Never     Social History     Substance and Sexual Activity   Drug Use Never     Social History     Tobacco Use   Smoking Status Never   Smokeless Tobacco Never     Family History   Problem Relation Age of Onset    Depression Mother     Mental illness Mother     ADD / ADHD Mother     Anxiety disorder Mother     Bipolar disorder Mother     OCD Mother     Mental illness Father     Cancer Maternal Grandfather     Mental illness Maternal Grandmother     Coronary artery disease Maternal Grandmother     Prostate cancer Paternal Grandfather     Cancer Paternal Grandfather     Dementia Paternal Grandmother     Breast cancer Paternal Grandmother     Substance Abuse Maternal Uncle     Depression Maternal Uncle     Prostate cancer Maternal Uncle     Drug abuse Maternal Uncle     Heart defect Maternal Grandmother        Meds/Allergies   Current Outpatient Medications   Medication Sig Dispense Refill    famotidine (PEPCID) 40 MG tablet Take 1 tablet (40 mg total) by mouth daily at bedtime 30 tablet 2    Deja FE 1.5/30 1.5-30 MG-MCG tablet       pantoprazole (PROTONIX) 40 mg tablet Take 1 tablet (40 mg total) by mouth 2 (two) times a day before meals 60 tablet 3     No current facility-administered medications for this visit.      No Known Allergies      OBJECTIVE  Visit Vitals  /84   Pulse 73   Ht 5' 5" (1.651 m)   Wt 66.8 kg (147 lb 3.2 oz) BMI 24.50 kg/m²   OB Status Unknown   Smoking Status Never   BSA 1.74 m²       Physical Exam  Constitutional:       Appearance: Normal appearance. HENT:      Head: Normocephalic and atraumatic. Mouth/Throat:      Mouth: Mucous membranes are moist.      Pharynx: Oropharynx is clear. Eyes:      General: No visual field deficit. Extraocular Movements: Extraocular movements intact. Pupils: Pupils are equal, round, and reactive to light. Cardiovascular:      Rate and Rhythm: Normal rate and regular rhythm. Pulses: Normal pulses. Heart sounds: Normal heart sounds. Pulmonary:      Effort: Pulmonary effort is normal. No respiratory distress. Breath sounds: Normal breath sounds. No wheezing, rhonchi or rales. Abdominal:      General: There is no distension. Tenderness: There is no abdominal tenderness. There is no guarding or rebound. Musculoskeletal:         General: No swelling or tenderness. Normal range of motion. Cervical back: Normal range of motion and neck supple. No tenderness. Right lower leg: No edema. Left lower leg: No edema. Skin:     General: Skin is warm and dry. Findings: No abrasion or wound. Neurological:      General: No focal deficit present. Mental Status: She is alert and oriented to person, place, and time. GCS: GCS eye subscore is 4. GCS verbal subscore is 5. GCS motor subscore is 6. Motor: No tremor. Deep Tendon Reflexes: Reflexes are normal and symmetric.    Psychiatric:         Mood and Affect: Mood normal.         Behavior: Behavior normal.       Lab Results:   Lab Results   Component Value Date/Time    Potassium 3.8 07/05/2023 04:08 PM    Chloride 106 07/05/2023 04:08 PM    CO2 25 07/05/2023 04:08 PM    BUN 6 07/05/2023 04:08 PM    Creatinine 0.74 07/05/2023 04:08 PM    Calcium 9.3 07/05/2023 04:08 PM    eGFR 115 07/05/2023 04:08 PM    TSH 3RD GENERATON 1.778 07/05/2023 04:08 PM       Imaging Studies:  MRI OF THE BRAIN AND ORBITS - WITH AND WITHOUT CONTRAST     INDICATION: H50.011: Monocular esotropia, right eye. COMPARISON:  None. TECHNIQUE:  Multiplanar, multisequence imaging of the brain and orbits was performed before and after gadolinium administration. IV Contrast:  6 mL of Gadobutrol injection (SINGLE-DOSE)     IMAGE QUALITY:  Diagnostic. FINDINGS:     BRAIN PARENCHYMA:  There is no discrete mass, mass effect or midline shift. Brainstem and cerebellum demonstrate normal signal. There is no intracranial hemorrhage. There is no evidence of acute infarction and diffusion imaging is unremarkable. There are no white matter changes in the cerebral hemispheres. Normal postcontrast imaging. ORBITS:  Normal globes. Normal ocular muscles. Optic nerves and chiasm are normal.  Normal cavernous sinuses. Preseptal and retrobulbar soft tissues are normal.     VENTRICLES:  Normal for the patient's age. SELLA AND PITUITARY GLAND: 6 mm posterior sellar T1 hyperintense lesion/structure possibly representing a Rathke cleft cyst. Short-term follow-up with contrast-enhanced pituitary MRI is recommended in 3 to 6 months. PARANASAL SINUSES:  Normal.     VASCULATURE:  Evaluation of the major intracranial vasculature demonstrates appropriate flow voids. CALVARIUM AND SKULL BASE:  Normal.     EXTRACRANIAL SOFT TISSUES:  Normal.     IMPRESSION:  6 mm posterior sellar lesion/structure possibly representing a Rathke cleft cyst. Short-term follow-up with contrast-enhanced pituitary MRI is recommended in 3 to 6 months. Workstation performed: [id number]       I have personally reviewed pertinent reports. Discussed with the patient and all questioned fully answered. She will call me if any problems arise. Portions of the record may have been created with voice recognition software.  Occasional wrong word or "sound a like" substitutions may have occurred due to the inherent limitations of voice recognition software. Read the chart carefully and recognize, using context, where substitutions have occurred.

## 2023-11-23 RX ORDER — SODIUM CHLORIDE 9 MG/ML
125 INJECTION, SOLUTION INTRAVENOUS CONTINUOUS
Status: CANCELLED | OUTPATIENT
Start: 2023-11-23

## 2023-11-27 ENCOUNTER — ANESTHESIA (OUTPATIENT)
Dept: GASTROENTEROLOGY | Facility: MEDICAL CENTER | Age: 23
End: 2023-11-27

## 2023-11-27 ENCOUNTER — HOSPITAL ENCOUNTER (EMERGENCY)
Facility: HOSPITAL | Age: 23
Discharge: HOME | End: 2023-11-27
Attending: EMERGENCY MEDICINE
Payer: COMMERCIAL

## 2023-11-27 ENCOUNTER — ANESTHESIA EVENT (OUTPATIENT)
Dept: GASTROENTEROLOGY | Facility: MEDICAL CENTER | Age: 23
End: 2023-11-27

## 2023-11-27 ENCOUNTER — HOSPITAL ENCOUNTER (OUTPATIENT)
Dept: GASTROENTEROLOGY | Facility: MEDICAL CENTER | Age: 23
Setting detail: OUTPATIENT SURGERY
Discharge: HOME | End: 2023-11-27
Payer: COMMERCIAL

## 2023-11-27 ENCOUNTER — APPOINTMENT (EMERGENCY)
Dept: CT IMAGING | Facility: HOSPITAL | Age: 23
End: 2023-11-27
Payer: COMMERCIAL

## 2023-11-27 VITALS
SYSTOLIC BLOOD PRESSURE: 101 MMHG | DIASTOLIC BLOOD PRESSURE: 59 MMHG | WEIGHT: 141 LBS | HEART RATE: 79 BPM | TEMPERATURE: 97.5 F | OXYGEN SATURATION: 98 % | RESPIRATION RATE: 15 BRPM | BODY MASS INDEX: 23.46 KG/M2

## 2023-11-27 VITALS
OXYGEN SATURATION: 99 % | DIASTOLIC BLOOD PRESSURE: 68 MMHG | TEMPERATURE: 98.6 F | HEART RATE: 65 BPM | RESPIRATION RATE: 16 BRPM | SYSTOLIC BLOOD PRESSURE: 112 MMHG

## 2023-11-27 DIAGNOSIS — R07.89 ATYPICAL CHEST PAIN: Primary | ICD-10-CM

## 2023-11-27 DIAGNOSIS — K21.00 GASTROESOPHAGEAL REFLUX DISEASE WITH ESOPHAGITIS WITHOUT HEMORRHAGE: ICD-10-CM

## 2023-11-27 LAB
ANION GAP SERPL CALCULATED.3IONS-SCNC: 5 MMOL/L
BASOPHILS # BLD AUTO: 0.03 THOUSANDS/ÂΜL (ref 0–0.1)
BASOPHILS NFR BLD AUTO: 0 % (ref 0–1)
BUN SERPL-MCNC: 5 MG/DL (ref 5–25)
CALCIUM SERPL-MCNC: 8.7 MG/DL (ref 8.4–10.2)
CHLORIDE SERPL-SCNC: 107 MMOL/L (ref 96–108)
CO2 SERPL-SCNC: 26 MMOL/L (ref 21–32)
CREAT SERPL-MCNC: 0.68 MG/DL (ref 0.6–1.3)
EOSINOPHIL # BLD AUTO: 0.06 THOUSAND/ÂΜL (ref 0–0.61)
EOSINOPHIL NFR BLD AUTO: 1 % (ref 0–6)
ERYTHROCYTE [DISTWIDTH] IN BLOOD BY AUTOMATED COUNT: 12.4 % (ref 11.6–15.1)
EXT PREGNANCY TEST URINE: NEGATIVE
EXT. CONTROL: NORMAL
GFR SERPL CREATININE-BSD FRML MDRD: 124 ML/MIN/1.73SQ M
GLUCOSE SERPL-MCNC: 110 MG/DL (ref 65–140)
HCT VFR BLD AUTO: 36.4 % (ref 34.8–46.1)
HGB BLD-MCNC: 13 G/DL (ref 11.5–15.4)
IMM GRANULOCYTES # BLD AUTO: 0.01 THOUSAND/UL (ref 0–0.2)
IMM GRANULOCYTES NFR BLD AUTO: 0 % (ref 0–2)
LYMPHOCYTES # BLD AUTO: 2.21 THOUSANDS/ÂΜL (ref 0.6–4.47)
LYMPHOCYTES NFR BLD AUTO: 28 % (ref 14–44)
MCH RBC QN AUTO: 32.5 PG (ref 26.8–34.3)
MCHC RBC AUTO-ENTMCNC: 35.7 G/DL (ref 31.4–37.4)
MCV RBC AUTO: 91 FL (ref 82–98)
MONOCYTES # BLD AUTO: 0.62 THOUSAND/ÂΜL (ref 0.17–1.22)
MONOCYTES NFR BLD AUTO: 8 % (ref 4–12)
NEUTROPHILS # BLD AUTO: 5.03 THOUSANDS/ÂΜL (ref 1.85–7.62)
NEUTS SEG NFR BLD AUTO: 63 % (ref 43–75)
NRBC BLD AUTO-RTO: 0 /100 WBCS
PLATELET # BLD AUTO: 234 THOUSANDS/UL (ref 149–390)
PMV BLD AUTO: 11.1 FL (ref 8.9–12.7)
POTASSIUM SERPL-SCNC: 3.6 MMOL/L (ref 3.5–5.3)
RBC # BLD AUTO: 4 MILLION/UL (ref 3.81–5.12)
SODIUM SERPL-SCNC: 138 MMOL/L (ref 135–147)
WBC # BLD AUTO: 7.96 THOUSAND/UL (ref 4.31–10.16)

## 2023-11-27 PROCEDURE — G1004 CDSM NDSC: HCPCS

## 2023-11-27 PROCEDURE — 36415 COLL VENOUS BLD VENIPUNCTURE: CPT | Performed by: EMERGENCY MEDICINE

## 2023-11-27 PROCEDURE — 43239 EGD BIOPSY SINGLE/MULTIPLE: CPT | Performed by: STUDENT IN AN ORGANIZED HEALTH CARE EDUCATION/TRAINING PROGRAM

## 2023-11-27 PROCEDURE — 96374 THER/PROPH/DIAG INJ IV PUSH: CPT

## 2023-11-27 PROCEDURE — 85025 COMPLETE CBC W/AUTO DIFF WBC: CPT | Performed by: EMERGENCY MEDICINE

## 2023-11-27 PROCEDURE — 80048 BASIC METABOLIC PNL TOTAL CA: CPT | Performed by: EMERGENCY MEDICINE

## 2023-11-27 PROCEDURE — 99285 EMERGENCY DEPT VISIT HI MDM: CPT

## 2023-11-27 PROCEDURE — 99285 EMERGENCY DEPT VISIT HI MDM: CPT | Performed by: EMERGENCY MEDICINE

## 2023-11-27 PROCEDURE — 71250 CT THORAX DX C-: CPT

## 2023-11-27 PROCEDURE — 88305 TISSUE EXAM BY PATHOLOGIST: CPT | Performed by: PATHOLOGY

## 2023-11-27 PROCEDURE — 81025 URINE PREGNANCY TEST: CPT | Performed by: ANESTHESIOLOGY

## 2023-11-27 PROCEDURE — 93005 ELECTROCARDIOGRAM TRACING: CPT

## 2023-11-27 RX ORDER — SODIUM CHLORIDE 9 MG/ML
125 INJECTION, SOLUTION INTRAVENOUS CONTINUOUS
Status: DISCONTINUED | OUTPATIENT
Start: 2023-11-27 | End: 2023-12-01 | Stop reason: HOSPADM

## 2023-11-27 RX ORDER — PROPOFOL 10 MG/ML
INJECTION, EMULSION INTRAVENOUS AS NEEDED
Status: DISCONTINUED | OUTPATIENT
Start: 2023-11-27 | End: 2023-11-27

## 2023-11-27 RX ORDER — KETOROLAC TROMETHAMINE 30 MG/ML
15 INJECTION, SOLUTION INTRAMUSCULAR; INTRAVENOUS ONCE
Status: COMPLETED | OUTPATIENT
Start: 2023-11-27 | End: 2023-11-27

## 2023-11-27 RX ORDER — LIDOCAINE HYDROCHLORIDE 20 MG/ML
INJECTION, SOLUTION EPIDURAL; INFILTRATION; INTRACAUDAL; PERINEURAL AS NEEDED
Status: DISCONTINUED | OUTPATIENT
Start: 2023-11-27 | End: 2023-11-27

## 2023-11-27 RX ADMIN — PROPOFOL 50 MG: 10 INJECTION, EMULSION INTRAVENOUS at 11:44

## 2023-11-27 RX ADMIN — TOPICAL ANESTHETIC 1 SPRAY: 200 SPRAY DENTAL; PERIODONTAL at 11:38

## 2023-11-27 RX ADMIN — LIDOCAINE HYDROCHLORIDE 60 MG: 20 INJECTION, SOLUTION EPIDURAL; INFILTRATION; INTRACAUDAL at 11:42

## 2023-11-27 RX ADMIN — KETOROLAC TROMETHAMINE 15 MG: 30 INJECTION, SOLUTION INTRAMUSCULAR; INTRAVENOUS at 22:20

## 2023-11-27 RX ADMIN — PROPOFOL 150 MG: 10 INJECTION, EMULSION INTRAVENOUS at 11:42

## 2023-11-27 RX ADMIN — SODIUM CHLORIDE 125 ML/HR: 0.9 INJECTION, SOLUTION INTRAVENOUS at 11:25

## 2023-11-27 RX ADMIN — PROPOFOL 50 MG: 10 INJECTION, EMULSION INTRAVENOUS at 11:46

## 2023-11-27 NOTE — H&P
History and Physical - [facility]  Everett Blackman 26 y.o. female MRN: [id number]          HPI: Everett Blackman is a 26y.o. year old female who presents for EGD to follow-up on LA grade C esophagitis seen on 8/2023 EGD. REVIEW OF SYSTEMS: Per the HPI, and otherwise unremarkable.     Historical Information   Past Medical History:   Diagnosis Date    Amplified musculoskeletal pain syndrome      Past Surgical History:   Procedure Laterality Date    COLONOSCOPY      UPPER GASTROINTESTINAL ENDOSCOPY       Social History   Social History     Substance and Sexual Activity   Alcohol Use Never     Social History     Substance and Sexual Activity   Drug Use Never     Social History     Tobacco Use   Smoking Status Never   Smokeless Tobacco Never     Family History   Problem Relation Age of Onset    Depression Mother     Mental illness Mother     ADD / ADHD Mother     Anxiety disorder Mother     Bipolar disorder Mother     OCD Mother     Mental illness Father     Cancer Maternal Grandfather     Mental illness Maternal Grandmother     Coronary artery disease Maternal Grandmother     Prostate cancer Paternal Grandfather     Cancer Paternal Grandfather     Dementia Paternal Grandmother     Breast cancer Paternal Grandmother     Substance Abuse Maternal Uncle     Depression Maternal Uncle     Prostate cancer Maternal Uncle     Drug abuse Maternal Uncle     Heart defect Maternal Grandmother        Meds/Allergies       Current Outpatient Medications:     famotidine (PEPCID) 40 MG tablet    Deja SZYMANSKI 1.5/30 1.5-30 MG-MCG tablet    pantoprazole (PROTONIX) 40 mg tablet    Current Facility-Administered Medications:     sodium chloride 0.9 % infusion, 125 mL/hr, Intravenous, Continuous, 125 mL/hr at 11/27/23 1125    No Known Allergies    Objective     /76   Pulse 87   Temp 97.5 °F (36.4 °C) (Temporal)   Resp 16   Wt 64 kg (141 lb)   LMP 11/17/2023   SpO2 100%   BMI 23.46 kg/m²       PHYSICAL EXAM    GEN: NAD  CARDIO: RRR  PULM: CTA bilaterally  ABD: soft, non-tender, non-distended  EXT: no lower extremity edema  NEURO: AAOx3      ASSESSMENT/PLAN:  26y.o. year old female here for EGD; she is stable and optimized for her procedure.

## 2023-11-27 NOTE — ANESTHESIA PREPROCEDURE EVALUATION
Procedure:  EGD    Relevant Problems   GI/HEPATIC   (+) Gastroesophageal reflux disease with esophagitis without hemorrhage      /RENAL   (+) Renal cyst      Other   (+) Amplified musculoskeletal pain syndrome        Physical Exam    Airway    Mallampati score: II  TM Distance: >3 FB  Neck ROM: full     Dental   No notable dental hx     Cardiovascular  Rhythm: regular, Rate: normal    Pulmonary   Breath sounds clear to auscultation    Other Findings  post-pubertal.      Anesthesia Plan  ASA Score- 2     Anesthesia Type- IV sedation with anesthesia with ASA Monitors. Additional Monitors:     Airway Plan:            Plan Factors-Exercise tolerance (METS): >4 METS. Chart reviewed. Existing labs reviewed. Patient is not a current smoker. Obstructive sleep apnea risk education given perioperatively. Induction- intravenous. Postoperative Plan-     Informed Consent- Anesthetic plan and risks discussed with patient.

## 2023-11-27 NOTE — ANESTHESIA POSTPROCEDURE EVALUATION
Post-Op Assessment Note    CV Status:  Stable    Pain management: adequate       Mental Status:  Awake   Hydration Status:  Stable   PONV Controlled:  Controlled   Airway Patency:  Patent     Post Op Vitals Reviewed: Yes    No anethesia notable event occurred.     Staff: Anesthesiologist             /59   Pulse 79   Temp 97.5 °F (36.4 °C) (Temporal)   Resp 15   Wt 64 kg (141 lb)   LMP 11/17/2023   SpO2 98%   BMI 23.46 kg/m²     BP      Temp      Pulse     Resp      SpO2

## 2023-11-28 LAB
ATRIAL RATE: 75 BPM
P AXIS: 72 DEGREES
PR INTERVAL: 132 MS
QRS AXIS: 54 DEGREES
QRSD INTERVAL: 100 MS
QT INTERVAL: 372 MS
QTC INTERVAL: 415 MS
T WAVE AXIS: 33 DEGREES
VENTRICULAR RATE: 75 BPM

## 2023-11-28 NOTE — ED PROVIDER NOTES
History  Chief Complaint   Patient presents with    Chest Pain     Pt reports chest pain and sob after endoscopy this morning. A 26year-old female with no significant past medical history; presents with chest pain and shortness of breath that began an hour prior to arrival.  Chest pain has been constant since onset. Shortness of breath is described as a tightness sensation predominantly in the back of her throat. Patient underwent an EGD this morning, stating she had felt well following the procedure. Patient is otherwise not had fever, chills, abdominal pain, nausea, vomiting, diarrhea, peripheral edema and rashes. History provided by:  Patient and medical records      Prior to Admission Medications   Prescriptions Last Dose Informant Patient Reported? Taking?    Deja SZYMANSKI 1.5/30 1.5-30 MG-MCG tablet  Self Yes No   famotidine (PEPCID) 40 MG tablet   No No   Sig: Take 1 tablet (40 mg total) by mouth daily at bedtime   pantoprazole (PROTONIX) 40 mg tablet   No No   Sig: Take 1 tablet (40 mg total) by mouth 2 (two) times a day before meals      Facility-Administered Medications: None       Past Medical History:   Diagnosis Date    Amplified musculoskeletal pain syndrome        Past Surgical History:   Procedure Laterality Date    COLONOSCOPY      UPPER GASTROINTESTINAL ENDOSCOPY         Family History   Problem Relation Age of Onset    Depression Mother     Mental illness Mother     ADD / ADHD Mother     Anxiety disorder Mother     Bipolar disorder Mother     OCD Mother     Mental illness Father     Cancer Maternal Grandfather     Mental illness Maternal Grandmother     Coronary artery disease Maternal Grandmother     Prostate cancer Paternal Grandfather     Cancer Paternal Grandfather     Dementia Paternal Grandmother     Breast cancer Paternal Grandmother     Substance Abuse Maternal Uncle     Depression Maternal Uncle     Prostate cancer Maternal Uncle     Drug abuse Maternal Uncle     Heart defect Maternal Grandmother      I have reviewed and agree with the history as documented. E-Cigarette/Vaping    E-Cigarette Use Never User      E-Cigarette/Vaping Substances    Nicotine No     THC No     CBD No     Flavoring No     Other No     Unknown No      Social History     Tobacco Use    Smoking status: Never    Smokeless tobacco: Never   Vaping Use    Vaping Use: Never used   Substance Use Topics    Alcohol use: Never    Drug use: Never       Review of Systems   Respiratory:  Positive for shortness of breath. Cardiovascular:  Positive for chest pain. All other systems reviewed and are negative. Physical Exam  Physical Exam  General Appearance: alert and oriented, nad, non toxic appearing  Skin:  Warm, dry, intact. No cyanosis  HEENT: Atraumatic, normocephalic. No eye drainage. Normal hearing. Moist mucous membranes. Normal voice. Neck: Supple, trachea midline. No crepitance or swelling. Cardiac: RRR; no murmurs, rub, gallops. No pedal edema, 2+ pulses  Pulmonary: lungs CTAB; no wheezes, rales, rhonchi. No crepitance. Gastrointestinal: abdomen soft, nontender, nondistended; no guarding or rebound tenderness; good bowel sounds, no mass or bruits  Extremities:  No deformities.   No calf tenderness, no clubbing  Neuro:  no focal motor or sensory deficits, CN 2-12 grossly intact  Psych:  Normal mood and affect, normal judgement and insight      Vital Signs  ED Triage Vitals [11/27/23 2033]   Temperature Pulse Respirations Blood Pressure SpO2   98.6 °F (37 °C) 81 17 127/77 98 %      Temp Source Heart Rate Source Patient Position - Orthostatic VS BP Location FiO2 (%)   Oral Monitor Lying Right arm --      Pain Score       --           Vitals:    11/27/23 2033 11/27/23 2100 11/27/23 2130 11/27/23 2200   BP: 127/77 115/64 109/64 109/62   Pulse: 81 74 83 73   Patient Position - Orthostatic VS: Lying Lying Lying Lying         Visual Acuity      ED Medications  Medications   ketorolac (TORADOL) injection 15 mg (has no administration in time range)       Diagnostic Studies  Results Reviewed       Procedure Component Value Units Date/Time    Basic metabolic panel [[id number]] Collected: 11/27/23 2046    Lab Status: Final result Specimen: Blood from Arm, Left Updated: 11/27/23 2115     Sodium 138 mmol/L      Potassium 3.6 mmol/L      Chloride 107 mmol/L      CO2 26 mmol/L      ANION GAP 5 mmol/L      BUN 5 mg/dL      Creatinine 0.68 mg/dL      Glucose 110 mg/dL      Calcium 8.7 mg/dL      eGFR 124 ml/min/1.73sq m     Narrative:      [place] guidelines for Chronic Kidney Disease (CKD):     Stage 1 with normal or high GFR (GFR > 90 mL/min/1.73 square meters)    Stage 2 Mild CKD (GFR = 60-89 mL/min/1.73 square meters)    Stage 3A Moderate CKD (GFR = 45-59 mL/min/1.73 square meters)    Stage 3B Moderate CKD (GFR = 30-44 mL/min/1.73 square meters)    Stage 4 Severe CKD (GFR = 15-29 mL/min/1.73 square meters)    Stage 5 End Stage CKD (GFR <15 mL/min/1.73 square meters)  Note: GFR calculation is accurate only with a steady state creatinine    CBC and differential [[id number]] Collected: 11/27/23 2046    Lab Status: Final result Specimen: Blood from Arm, Left Updated: 11/27/23 2054     WBC 7.96 Thousand/uL      RBC 4.00 Million/uL      Hemoglobin 13.0 g/dL      Hematocrit 36.4 %      MCV 91 fL      MCH 32.5 pg      MCHC 35.7 g/dL      RDW 12.4 %      MPV 11.1 fL      Platelets 450 Thousands/uL      nRBC 0 /100 WBCs      Neutrophils Relative 63 %      Immat GRANS % 0 %      Lymphocytes Relative 28 %      Monocytes Relative 8 %      Eosinophils Relative 1 %      Basophils Relative 0 %      Neutrophils Absolute 5.03 Thousands/µL      Immature Grans Absolute 0.01 Thousand/uL      Lymphocytes Absolute 2.21 Thousands/µL      Monocytes Absolute 0.62 Thousand/µL      Eosinophils Absolute 0.06 Thousand/µL      Basophils Absolute 0.03 Thousands/µL                    CT chest wo contrast   Final Result by Edna Mascorro Robert Tucker MD (11/27 2208)   Addendum (preliminary) 1 of 1 by Aura Moscoso MD (11/27 2208)   ADDENDUM:      Thin linear lucency posterior to the trachea is favored to represent the    partially collapsed esophageal lumen and is unlikely to be a    pneumomediastinum (series 3, images 70-82; series 603, image 79). There is    no subcutaneous emphysema. I personally discussed this study with Galina Manjarrez on 11/27/2023 10:08    PM.            Final      No acute thoracic pathology. Workstation performed: [id number]                    Procedures  Procedures   ECG 12 Lead Documentation  Date/Time: today/date: 11/27/2023  Performed by: Cory Emmanuel    ECG reviewed by me, the ED Provider: yes    Patient location:  ED   Previous ECG:  No old for comparison   Rate:  75  ECG rate assessment: normal    Rhythm: sinus rhythm    Ectopy:  none    QRS axis:  Normal  Intervals: normal   Q waves: None   ST segments:  Normal  T waves: inverted in III      Impression: NSR with isolated T wave inversion in III       ED Course  ED Course as of 11/27/23 2219 Mon Nov 27, 2023 2120 Labs WNL   2213 CT chest wo contrast  Thin linear lucency posterior to the trachea is favored to represent the partially collapsed esophageal lumen and is unlikely to be a pneumomediastinum (series 3, images 70-82; series 603, image 79). There is no subcutaneous emphysema. SBIRT 22yo+      Flowsheet Row Most Recent Value   Initial Alcohol Screen: US AUDIT-C     1. How often do you have a drink containing alcohol? 0 Filed at: 11/27/2023 2106   2. How many drinks containing alcohol do you have on a typical day you are drinking? 0 Filed at: 11/27/2023 2106   3b. FEMALE Any Age, or MALE 65+: How often do you have 4 or more drinks on one occassion? 0 Filed at: 11/27/2023 2106   Audit-C Score 0 Filed at: 11/27/2023 2106   PAWEL: How many times in the past year have you. ..     Used an illegal drug or used a prescription medication for non-medical reasons? Never Filed at: 11/27/2023 2106                      Medical Decision Making  A 25 yo female presents with chest pain and dyspnea that started one hour PTA. Pt did undergo EGD with biopsies this morning. Will obtain labs and CT chest for evaluation of esophageal injury and aspiration pneumonia. Labs reassuring. CT chest without acute pathology. Symptoms likely due to irritation secondary to scope, will give toradol and proceed with discharge home. Strict return precautions discussed. Amount and/or Complexity of Data Reviewed  Labs: ordered. Radiology: ordered. Decision-making details documented in ED Course. Risk  Prescription drug management. Disposition  Final diagnoses:   Atypical chest pain     Time reflects when diagnosis was documented in both MDM as applicable and the Disposition within this note       Time User Action Codes Description Comment    11/27/2023 10:17 PM Clifton Bullard Add [R07.89] Atypical chest pain           ED Disposition       ED Disposition   Discharge    Condition   Stable    Date/Time   Mon Nov 27, 2023 2217    Comment   Jozef Rodriguezd discharge to home/self care.                    Follow-up Information       Follow up With Specialties Details Why Contact Info Additional Information    Shereen Kaye MD Family Medicine Schedule an appointment as soon as possible for a visit in 3 days For re-evaluation [facility] [id number]  [address] [place] [address],[address] Emergency Department Emergency Medicine Go to  If symptoms worsen [address] [id number] [facility] Emergency Department, [address] [place] [address], [id number]            Current Discharge Medication List        CONTINUE these medications which have NOT CHANGED    Details   famotidine (PEPCID) 40 MG tablet Take 1 tablet (40 mg total) by mouth daily at bedtime  Qty: 30 tablet, Refills: 2    Associated Diagnoses: Generalized abdominal pain; Postprandial nausea      Deja FE 1.5/30 1.5-30 MG-MCG tablet       pantoprazole (PROTONIX) 40 mg tablet Take 1 tablet (40 mg total) by mouth 2 (two) times a day before meals  Qty: 60 tablet, Refills: 3    Associated Diagnoses: Gastroesophageal reflux disease with esophagitis without hemorrhage             No discharge procedures on file.     PDMP Review       None            ED Provider  Electronically Signed by             Hugh Beasley DO  11/27/23 1020

## 2023-11-30 PROCEDURE — 88305 TISSUE EXAM BY PATHOLOGIST: CPT | Performed by: PATHOLOGY

## 2023-12-01 DIAGNOSIS — R10.84 GENERALIZED ABDOMINAL PAIN: ICD-10-CM

## 2023-12-01 DIAGNOSIS — R11.0 POSTPRANDIAL NAUSEA: ICD-10-CM

## 2023-12-01 RX ORDER — FAMOTIDINE 40 MG/1
40 TABLET, FILM COATED ORAL
Qty: 90 TABLET | Refills: 1 | Status: SHIPPED | OUTPATIENT
Start: 2023-12-01

## 2023-12-19 DIAGNOSIS — K21.00 GASTROESOPHAGEAL REFLUX DISEASE WITH ESOPHAGITIS WITHOUT HEMORRHAGE: ICD-10-CM

## 2023-12-19 RX ORDER — PANTOPRAZOLE SODIUM 40 MG/1
40 TABLET, DELAYED RELEASE ORAL
Qty: 180 TABLET | Refills: 1 | Status: SHIPPED | OUTPATIENT
Start: 2023-12-19

## 2023-12-21 ENCOUNTER — OFFICE VISIT (OUTPATIENT)
Dept: GASTROENTEROLOGY | Facility: CLINIC | Age: 23
End: 2023-12-21
Payer: COMMERCIAL

## 2023-12-21 VITALS
HEIGHT: 65 IN | SYSTOLIC BLOOD PRESSURE: 106 MMHG | TEMPERATURE: 96.4 F | WEIGHT: 144 LBS | BODY MASS INDEX: 23.99 KG/M2 | DIASTOLIC BLOOD PRESSURE: 64 MMHG

## 2023-12-21 DIAGNOSIS — K21.00 GASTROESOPHAGEAL REFLUX DISEASE WITH ESOPHAGITIS WITHOUT HEMORRHAGE: Primary | ICD-10-CM

## 2023-12-21 PROCEDURE — 99213 OFFICE O/P EST LOW 20 MIN: CPT | Performed by: PHYSICIAN ASSISTANT

## 2023-12-21 NOTE — PATIENT INSTRUCTIONS
GERD (Gastroesophageal Reflux Disease)   AMBULATORY CARE:   Gastroesophageal reflux disease (GERD)  is reflux that happens more than 2 times a week for a few weeks. Reflux means acid and food in your stomach back up into your esophagus. GERD can cause other health problems over time if it is not treated.       Common causes of GERD:  GERD often happens because the lower muscle (sphincter) of the esophagus does not close properly. The sphincter normally opens to let food into the stomach. It then closes to keep food and stomach acid in the stomach. If the sphincter does not close properly, stomach acid and food back up (reflux) into the esophagus. The following may increase your risk for GERD:  Certain foods such as spicy foods, chocolate, foods that contain caffeine, peppermint, and fried foods    Hiatal hernia    Certain medicines such as calcium channel blockers (used to treat high blood pressure), allergy medicines, sedatives, or antidepressants    Pregnancy, obesity, or scleroderma    Lying down after a meal    Drinking alcohol or smoking cigarettes    Signs and symptoms:   Heartburn (burning pain in your chest)    Pain after meals that spreads to your neck, jaw, or shoulder    Pain that gets better when you change positions    Bitter or acid taste in your mouth    A dry cough    Trouble swallowing or pain with swallowing    Hoarseness or a sore throat    Burping or hiccups    Feeling full soon after you start eating    Call your local emergency number (911 in the [place]) if:   You have severe chest pain and sudden trouble breathing.      Seek care immediately if:   You have trouble breathing after you vomit.    You have trouble swallowing, or pain with swallowing.    Your bowel movements are black, bloody, or tarry-looking.    Your vomit looks like coffee grounds or has blood in it.    Call your doctor or gastroenterologist if:   You feel full and cannot burp or vomit.    You vomit large amounts, or you vomit  often.    You are losing weight without trying.    Your symptoms get worse or do not improve with treatment.    You have questions or concerns about your condition or care.    Treatment for GERD:   Medicines  are used to decrease stomach acid. Medicine may also be used to help your lower esophageal sphincter and stomach contract (tighten) more.    Surgery  is done to wrap the upper part of the stomach around the esophageal sphincter. This will strengthen the sphincter and prevent reflux.    Manage GERD:       Do not have foods or drinks that may increase heartburn.  These include chocolate, peppermint, fried or fatty foods, drinks that contain caffeine, or carbonated drinks (soda). Other foods include spicy foods, onions, tomatoes, and tomato-based foods. Do not have foods or drinks that can irritate your esophagus, such as citrus fruits, juices, and alcohol.    Do not eat large meals.  When you eat a lot of food at one time, your stomach needs more acid to digest it. Eat 6 small meals each day instead of 3 large meals, and eat slowly. Do not eat meals 2 to 3 hours before bedtime.    Elevate the head of your bed.  Place 6-inch blocks under the head of your bed frame. You may also use more than one pillow under your head and shoulders while you sleep.    Maintain a healthy weight.  If you are overweight, weight loss may help relieve symptoms of GERD.    Do not smoke.  Smoking weakens the lower esophageal sphincter and increases the risk of GERD. Ask your healthcare provider for information if you currently smoke and need help to quit. E-cigarettes or smokeless tobacco still contain nicotine. Talk to your healthcare provider before you use these products.    Do not put pressure on your abdomen.  Pressure pushes acid up into your esophagus. Do not wear clothing that is tight around your waist. Do not bend over. Bend at the knees if you need to pick something up.  Follow up with your doctor or gastroenterologist as  directed:  Write down your questions so you remember to ask them during your visits.  © Copyright [employer] 2023 Information is for End User's use only and may not be sold, redistributed or otherwise used for commercial purposes.  The above information is an  only. It is not intended as medical advice for individual conditions or treatments. Talk to your doctor, nurse or pharmacist before following any medical regimen to see if it is safe and effective for you.

## 2023-12-21 NOTE — PROGRESS NOTES
[facility] - Outpatient Follow-up Note  Santiago Vela 23 y.o. female MRN: [id number]  Encounter: [id number]    ASSESSMENT AND PLAN:    1. Gastroesophageal reflux disease with esophagitis without hemorrhage  Patient with a history of LA grade C esophagitis with recent EGD showing healing of esophagitis after PPI twice daily.  She is feeling great with no new complaints or alarming symptoms.  We reviewed her recent EGD report and biopsy findings.  Patient expressed understanding the results.  All questions answered.    At this time we will lower her dose of PPI from twice daily to once daily.  She will take pantoprazole 40 mg once daily before breakfast and continue with famotidine 40 mg once daily at bedtime  We discussed and I recommended an ongoing GERD diet and lifestyle.  Discussed the importance of smaller, more frequent meals, avoiding and limiting excess caffeine, chocolate, fatty, greasy, spicy foods.  Further workup planned at this time    Patient was instructed to call the office with any questions, concerns, new/ worsening/ persisting GI symptoms. Advised patient go to the ER with any severe or worsening abdominal pain, fevers/ chills, intractable N/V, chest pain, SOB, dizziness, lightheadedness, feeling something stuck in esophagus that will not go down. Patient expressed understanding and is in agreement with treatment plan.     Will plan to follow up in ~ 4 months     ____________________    SUBJECTIVE:      Patient presents to the office today for follow-up.  She was last seen by me in the office 9/21/2023, previous office note was reviewed.  At last office visit I recommend she continue pantoprazole 40 mg before breakfast and added an additional pantoprazole 40 mg before dinner.  I recommended she continue famotidine 20 mg at bedtime and ordered a repeat EGD in about 2 months to assess esophagitis healing.  Repeat EGD was completed 11/27/2023 which revealed mild abnormal  mucosa with plaque in the lower third of the esophagus, 1 cm hiatal was seen, otherwise normal stomach and duodenum.  Mid and proximal esophageal biopsies were normal and negative for EOE.  Distal esophageal biopsy showed chronic inflammation negative for Hernandez's.  It appears after the EGD the patient was seen in the emergency room with complaints of chest pain and shortness of breath.  ER note from 11/27/2023 reviewed.  CBC, BMP, EKG, CT of the chest all unremarkable.  She was given Toradol and discharged home.    Patient reports feeling overall. No new complaints or concerns today.   She is taking pantoprazole 40 mg before breakfast and before dinner and famotidine 40 mg at bedtime. She declines heartburn or acid reflux on these medications.   She is very pleased with how she has been feeling.  BMs are normal, regular, formed and brown.   She tells me her weight has been fluctuating up and down about 5 pounds over the last few months.  Patient denies any current or recent fevers/ chills,abdominal pain, nausea, vomiting, change in bowel habits, diarrhea, constipation, black or bloody stools, heartburn, dysphagia, odynophagia. Denies chest pain, SOB,    Review of Systems   Constitutional:  Negative for chills and fever.   HENT:  Negative for ear pain and sore throat.    Eyes:  Negative for pain and visual disturbance.   Respiratory:  Negative for cough and shortness of breath.    Cardiovascular:  Negative for chest pain and palpitations.   Gastrointestinal:  Negative for blood in stool and vomiting.   Genitourinary:  Negative for dysuria and hematuria.   Musculoskeletal:  Negative for arthralgias and back pain.   Skin:  Negative for color change and rash.   Neurological:  Negative for seizures and syncope.   All other systems reviewed and are negative.     Historical Information   Past Medical History:   Diagnosis Date    Amplified musculoskeletal pain syndrome      Past Surgical History:   Procedure Laterality  Date    COLONOSCOPY      UPPER GASTROINTESTINAL ENDOSCOPY       Social History   Social History     Substance and Sexual Activity   Alcohol Use Never     Social History     Substance and Sexual Activity   Drug Use Never     Social History     Tobacco Use   Smoking Status Never   Smokeless Tobacco Never     Family History   Problem Relation Age of Onset    Depression Mother     Mental illness Mother     ADD / ADHD Mother     Anxiety disorder Mother     Bipolar disorder Mother     OCD Mother     Mental illness Father     Cancer Maternal Grandfather     Mental illness Maternal Grandmother     Coronary artery disease Maternal Grandmother     Prostate cancer Paternal Grandfather     Cancer Paternal Grandfather     Dementia Paternal Grandmother     Breast cancer Paternal Grandmother     Substance Abuse Maternal Uncle     Depression Maternal Uncle     Prostate cancer Maternal Uncle     Drug abuse Maternal Uncle     Heart defect Maternal Grandmother        Meds/Allergies       Current Outpatient Medications:     famotidine (PEPCID) 40 MG tablet    Deja FE 1.5/30 1.5-30 MG-MCG tablet    pantoprazole (PROTONIX) 40 mg tablet    No Known Allergies        Objective     Wt Readings from Last 3 Encounters:   11/27/23 64 kg (141 lb)   11/14/23 66.8 kg (147 lb 3.2 oz)   10/09/23 67.9 kg (149 lb 12.8 oz)     Temp Readings from Last 3 Encounters:   11/27/23 98.6 °F (37 °C) (Oral)   11/27/23 97.5 °F (36.4 °C) (Temporal)   10/09/23 (!) 97.4 °F (36.3 °C) (Temporal)     BP Readings from Last 3 Encounters:   11/27/23 112/68   11/27/23 101/59   11/14/23 140/84     Pulse Readings from Last 3 Encounters:   11/27/23 65   11/27/23 79   11/14/23 73          PHYSICAL EXAM:      Physical Exam  Vitals reviewed.   Constitutional:       General: She is not in acute distress.     Appearance: She is not toxic-appearing.   HENT:      Head: Normocephalic and atraumatic.   Eyes:      Extraocular Movements: Extraocular movements intact.       Conjunctiva/sclera: Conjunctivae normal.   Cardiovascular:      Rate and Rhythm: Normal rate and regular rhythm.   Pulmonary:      Effort: Pulmonary effort is normal. No respiratory distress.      Breath sounds: Normal breath sounds.   Abdominal:      General: Bowel sounds are normal.      Palpations: Abdomen is soft.      Tenderness: There is no abdominal tenderness.   Musculoskeletal:         General: No swelling or tenderness.      Cervical back: Normal range of motion and neck supple.   Skin:     General: Skin is warm and dry.      Coloration: Skin is not jaundiced.   Neurological:      General: No focal deficit present.      Mental Status: She is alert and oriented to person, place, and time. Mental status is at baseline.   Psychiatric:         Mood and Affect: Mood normal.         Behavior: Behavior normal.         Thought Content: Thought content normal.        Lab Results:   Lab Results   Component Value Date    WBC 7.96 11/27/2023    HGB 13.0 11/27/2023    HCT 36.4 11/27/2023    MCV 91 11/27/2023     11/27/2023       Lab Results   Component Value Date    SODIUM 138 11/27/2023    K 3.6 11/27/2023     11/27/2023    CO2 26 11/27/2023    AGAP 5 11/27/2023    BUN 5 11/27/2023    CREATININE 0.68 11/27/2023    GLUC 110 11/27/2023    CALCIUM 8.7 11/27/2023    AST 15 07/05/2023    ALT 25 07/05/2023    ALKPHOS 59 07/05/2023    TP 7.9 07/05/2023    TBILI 0.64 07/05/2023    EGFR 124 11/27/2023     Lab Results   Component Value Date    CRP 7.5 (H) 08/21/2023     Celiac testing was negative 8/2023    Radiology Results:   CT chest wo contrast    Addendum Date: 11/27/2023 Addendum:   ADDENDUM: Thin linear lucency posterior to the trachea is favored to represent the partially collapsed esophageal lumen and is unlikely to be a pneumomediastinum (series 3, images 70-82; series 603, image 79). There is no subcutaneous emphysema. I personally discussed this study with KYLIE CHOI on 11/27/2023 10:08 PM.      Result Date: 11/27/2023  Narrative: CT CHEST WITHOUT IV CONTRAST INDICATION:   Chest pain, nonspecific chest pain and sob s/p EGD. COMPARISON:  None. TECHNIQUE: CT examination of the chest was performed without intravenous contrast. Multiplanar 2D reformatted images were created from the source data. This examination, like all CT scans performed in the [facility], was performed utilizing techniques to minimize radiation dose exposure, including the use of iterative reconstruction and automated exposure control. Radiation dose length product (DLP) for this visit:  173.01 mGy-cm FINDINGS: LUNGS:  Lungs are clear.  There is no tracheal or endobronchial lesion. PLEURA:  Unremarkable. HEART/GREAT VESSELS: Heart is unremarkable for patient's age.  No thoracic aortic aneurysm. MEDIASTINUM AND JOSÉ LUIS: No pneumomediastinum. No lymphadenopathy. CHEST WALL AND LOWER NECK:  Unremarkable. VISUALIZED STRUCTURES IN THE UPPER ABDOMEN:  Unremarkable. OSSEOUS STRUCTURES:  No acute fracture or destructive osseous lesion.     Impression: No acute thoracic pathology. Workstation performed: [id number]     Prior Procedure Results/Reports   EGD 8/31/2023 reviewed and showed LA grade C esophagitis, 1 cm hiatal hernia otherwise normal.  Distal esophageal biopsies showed mild chronic inflammation.  Gastric biopsies negative for H. pylori.     Colonoscopy 8/31/2023 reviewed and was completely normal aside from worms seen throughout entire colon. Was recommended to  empirically treat with albendazole 400 mg once, then repeat in 2 weeks for suspected pinworm infection.  Random colon biopsies were normal. Pathology of warm specimens seen during procedure was unable to be assessed.     Jasmin Menon PA-C   Available on [facility] [employer]

## 2024-01-08 ENCOUNTER — TELEPHONE (OUTPATIENT)
Dept: ENDOCRINOLOGY | Facility: CLINIC | Age: 24
End: 2024-01-08

## 2024-01-08 ENCOUNTER — APPOINTMENT (OUTPATIENT)
Dept: LAB | Facility: MEDICAL CENTER | Age: 24
End: 2024-01-08
Payer: COMMERCIAL

## 2024-01-08 DIAGNOSIS — R63.4 WEIGHT LOSS: ICD-10-CM

## 2024-01-08 LAB
T4 FREE SERPL-MCNC: 0.87 NG/DL (ref 0.61–1.12)
TSH SERPL DL<=0.05 MIU/L-ACNC: 1.86 UIU/ML (ref 0.45–4.5)

## 2024-01-08 PROCEDURE — 84439 ASSAY OF FREE THYROXINE: CPT

## 2024-01-08 PROCEDURE — 36415 COLL VENOUS BLD VENIPUNCTURE: CPT

## 2024-01-08 PROCEDURE — 84443 ASSAY THYROID STIM HORMONE: CPT

## 2024-01-09 ENCOUNTER — HOSPITAL ENCOUNTER (OUTPATIENT)
Facility: MEDICAL CENTER | Age: 24
Discharge: HOME | End: 2024-01-09
Payer: COMMERCIAL

## 2024-01-09 ENCOUNTER — TELEPHONE (OUTPATIENT)
Dept: ENDOCRINOLOGY | Facility: CLINIC | Age: 24
End: 2024-01-09

## 2024-01-09 DIAGNOSIS — E23.6 RATHKE'S CLEFT CYST (HCC): ICD-10-CM

## 2024-01-09 PROCEDURE — 70553 MRI BRAIN STEM W/O & W/DYE: CPT

## 2024-01-09 PROCEDURE — A9585 GADOBUTROL INJECTION: HCPCS | Performed by: STUDENT IN AN ORGANIZED HEALTH CARE EDUCATION/TRAINING PROGRAM

## 2024-01-09 PROCEDURE — G1004 CDSM NDSC: HCPCS

## 2024-01-09 RX ORDER — GADOBUTROL 604.72 MG/ML
6 INJECTION INTRAVENOUS
Status: COMPLETED | OUTPATIENT
Start: 2024-01-09 | End: 2024-01-09

## 2024-01-09 RX ADMIN — GADOBUTROL 6 ML: 604.72 INJECTION INTRAVENOUS at 16:11

## 2024-01-09 NOTE — TELEPHONE ENCOUNTER
Called patient to discuss lab results.     TFTs normal. MRI to be performed later today and will discuss results at upcoming appointment on 1/23/24. Patient aware and agreeable.

## 2024-01-23 ENCOUNTER — OFFICE VISIT (OUTPATIENT)
Dept: ENDOCRINOLOGY | Facility: CLINIC | Age: 24
End: 2024-01-23
Payer: COMMERCIAL

## 2024-01-23 VITALS
BODY MASS INDEX: 24.39 KG/M2 | SYSTOLIC BLOOD PRESSURE: 110 MMHG | DIASTOLIC BLOOD PRESSURE: 74 MMHG | HEIGHT: 65 IN | HEART RATE: 80 BPM | WEIGHT: 146.4 LBS

## 2024-01-23 DIAGNOSIS — E23.6 RATHKE'S CLEFT CYST (HCC): Primary | ICD-10-CM

## 2024-01-23 PROCEDURE — 99214 OFFICE O/P EST MOD 30 MIN: CPT

## 2024-01-23 NOTE — PROGRESS NOTES
Established Patient Progress Note      Chief Complaint   Patient presents with    Pituitary Problem        Impression & Plan:    Problem List Items Addressed This Visit          Endocrine    Rathke's cleft cyst (HCC) - Primary     This was stable on recent MRI. She denies symptoms associated with pituitary dysfunction. Thyroid labs were normal. Will hold off on further pituitary labs since she remains on OCP. Will repeat MRI in 1 year. She will notify office if she has any symptoms.     She had visual field testing, she will request ophthalmologist to fax results to us.             History of Present Illness:   Santiago Vela is a 23 y.o. female with an incidental Rathke cyst discovered on brain MRI seen in follow up.     She was ordered an MRI of the brain and orbits by her ophthalmologist for evaluation of monocular esotropia and completed this on 10/4/23. This showed a 6 mm posterior sellar T1 hyperintense lesion/structure possibly representing a Rathke cleft cyst. She had repeat MRI this month, which showed stable cyst. She denies dizziness, headaches or acute loss of vision. She is taking OCP so gonadotropins, IGF-1 and ACTH were deferred due to accuracy. Thyroid labs were normal.     Her weight has stabilized, denies nausea, vomiting or weakness. She reports appetite has improved since her GI issues have resolved. She does report fatigue and cold intolerance.     Patient Active Problem List   Diagnosis    Amplified musculoskeletal pain syndrome    Renal cyst    Gastroesophageal reflux disease with esophagitis without hemorrhage    Rathke's cleft cyst (HCC)      Past Medical History:   Diagnosis Date    Amplified musculoskeletal pain syndrome       Past Surgical History:   Procedure Laterality Date    COLONOSCOPY      UPPER GASTROINTESTINAL ENDOSCOPY        Family History   Problem Relation Age of Onset    Depression Mother     Mental illness Mother     ADD / ADHD Mother     Anxiety disorder Mother     Bipolar  "disorder Mother     OCD Mother     Mental illness Father     Cancer Maternal Grandfather     Mental illness Maternal Grandmother     Coronary artery disease Maternal Grandmother     Prostate cancer Paternal Grandfather     Cancer Paternal Grandfather     Dementia Paternal Grandmother     Breast cancer Paternal Grandmother     Substance Abuse Maternal Uncle     Depression Maternal Uncle     Prostate cancer Maternal Uncle     Drug abuse Maternal Uncle     Heart defect Maternal Grandmother      Social History     Tobacco Use    Smoking status: Never    Smokeless tobacco: Never   Substance Use Topics    Alcohol use: Never     No Known Allergies      Current Outpatient Medications:     famotidine (PEPCID) 40 MG tablet, TAKE 1 TABLET BY MOUTH DAILY AT BEDTIME, Disp: 90 tablet, Rfl: 1    Deja FE 1.5/30 1.5-30 MG-MCG tablet, , Disp: , Rfl:     pantoprazole (PROTONIX) 40 mg tablet, TAKE 1 TABLET BY MOUTH 2 TIMES A DAY BEFORE MEALS. (Patient taking differently: Take 40 mg by mouth daily), Disp: 180 tablet, Rfl: 1    Review of Systems   Constitutional:  Positive for fatigue. Negative for activity change, appetite change, chills, diaphoresis, fever and unexpected weight change.   Eyes:  Negative for visual disturbance.   Cardiovascular:  Negative for chest pain and palpitations.   Gastrointestinal:  Negative for abdominal pain, diarrhea, nausea and vomiting.   Endocrine: Positive for cold intolerance. Negative for heat intolerance.   Neurological:  Negative for weakness.   All other systems reviewed and are negative.      Physical Exam:  Body mass index is 24.36 kg/m².  /74   Pulse 80   Ht 5' 5\" (1.651 m)   Wt 66.4 kg (146 lb 6.4 oz)   BMI 24.36 kg/m²    Wt Readings from Last 3 Encounters:   01/23/24 66.4 kg (146 lb 6.4 oz)   12/21/23 65.3 kg (144 lb)   11/27/23 64 kg (141 lb)       Physical Exam  Vitals reviewed.   Constitutional:       Appearance: Normal appearance. She is normal weight.   Neurological:      " Mental Status: She is alert and oriented to person, place, and time.   Psychiatric:         Mood and Affect: Mood normal.         Thought Content: Thought content normal.         Labs:     Lab Results   Component Value Date    JII7PPKPGYNL 1.862 01/08/2024    FGE4LMWCNVLB 1.778 07/05/2023     Lab Results   Component Value Date    FREET4 0.87 01/08/2024       No orders of the defined types were placed in this encounter.      There are no Patient Instructions on file for this visit.    Discussed with the patient and all questioned fully answered. She will call me if any problems arise.    LAUREN Melendez

## 2024-01-23 NOTE — ASSESSMENT & PLAN NOTE
This was stable on recent MRI. She denies symptoms associated with pituitary dysfunction. Thyroid labs were normal. Will hold off on further pituitary labs since she remains on OCP. Will repeat MRI in 1 year. She will notify office if she has any symptoms.     She had visual field testing, she will request ophthalmologist to fax results to us.

## 2024-03-19 ENCOUNTER — CONSULT (OUTPATIENT)
Dept: FAMILY MEDICINE CLINIC | Facility: CLINIC | Age: 24
End: 2024-03-19
Payer: COMMERCIAL

## 2024-03-19 VITALS
BODY MASS INDEX: 23.89 KG/M2 | HEART RATE: 86 BPM | TEMPERATURE: 98 F | SYSTOLIC BLOOD PRESSURE: 118 MMHG | HEIGHT: 65 IN | WEIGHT: 143.4 LBS | DIASTOLIC BLOOD PRESSURE: 66 MMHG | OXYGEN SATURATION: 100 %

## 2024-03-19 DIAGNOSIS — Z01.818 PRE-OP EXAMINATION: ICD-10-CM

## 2024-03-19 DIAGNOSIS — H50.9 STRABISMUS: Primary | ICD-10-CM

## 2024-03-19 PROCEDURE — 99214 OFFICE O/P EST MOD 30 MIN: CPT | Performed by: FAMILY MEDICINE

## 2024-03-19 NOTE — PROGRESS NOTES
[facility] PRE-OPERATIVE EVALUATION  [facility]    NAME: Santiago Vela  AGE: 23 y.o. SEX: female  : 2000     DATE: 3/19/2024    [facility] Pre-Operative Evaluation      Chief Complaint: Pre-operative Evaluation     Surgery: Eye correction surgery, strabismus  Anticipated Date of Surgery: 3/22/2024  Referring Provider: Dr Buenrostro     History of Present Illness:     Santiago Vela is a 23 y.o. female who presents to the office today for a preoperative consultation at the request of surgeon, Dr Buenrostro, who plans on performing strabismus eye correction surgery on 3/22/2024. Planned anesthesia is general. Patient has a bleeding risk of: no recent abnormal bleeding. Patient does not have objections to receiving blood products if needed. Current anti-platelet/anti-coagulation medications that the patient is prescribed includes: n/a     Assessment of Chronic Conditions:   - none     Assessment of Cardiac Risk:  Denies unstable or severe angina or MI in the last 6 weeks or history of stent placement in the last year   Denies decompensated heart failure (e.g. New onset heart failure, NYHA functional class IV heart failure, or worsening existing heart failure)  Denies significant arrhythmias such as high grade AV block, symptomatic ventricular arrhythmia, newly recognized ventricular tachycardia, supraventricular tachycardia with resting heart rate >100, or symptomatic bradycardia  Denies severe heart valve disease including aortic stenosis or symptomatic mitral stenosis     Exercise Capacity:  Able to walk 4 blocks without symptoms?: Yes  Able to walk 2 flights without symptoms?: Yes    Prior Anesthesia Reactions: No     Personal history of venous thromboembolic disease? No    History of steroid use for >2 weeks within last year? No         Review of Systems:     Review of Systems   Constitutional: Negative.  Negative for chills and fever.   HENT: Negative.   Negative for ear pain and sore throat.    Eyes:  Negative for pain and visual disturbance.   Respiratory: Negative.  Negative for cough and shortness of breath.    Cardiovascular: Negative.  Negative for chest pain and palpitations.   Gastrointestinal: Negative.  Negative for abdominal pain and vomiting.   Genitourinary: Negative.  Negative for dysuria and hematuria.   Musculoskeletal:  Negative for arthralgias and back pain.   Skin:  Negative for color change and rash.   Neurological: Negative.  Negative for seizures and syncope.   Psychiatric/Behavioral: Negative.     All other systems reviewed and are negative.      Current Problem List:     Patient Active Problem List   Diagnosis    Amplified musculoskeletal pain syndrome    Renal cyst    Gastroesophageal reflux disease with esophagitis without hemorrhage    Rathke's cleft cyst (HCC)       Allergies:     No Known Allergies    Current Medications:       Current Outpatient Medications:     famotidine (PEPCID) 40 MG tablet, TAKE 1 TABLET BY MOUTH DAILY AT BEDTIME, Disp: 90 tablet, Rfl: 1    Deja FE 1.5/30 1.5-30 MG-MCG tablet, , Disp: , Rfl:     pantoprazole (PROTONIX) 40 mg tablet, TAKE 1 TABLET BY MOUTH 2 TIMES A DAY BEFORE MEALS. (Patient taking differently: Take 40 mg by mouth daily), Disp: 180 tablet, Rfl: 1    Past Medical History:       Past Medical History:   Diagnosis Date    Amplified musculoskeletal pain syndrome         Past Surgical History:   Procedure Laterality Date    COLONOSCOPY      UPPER GASTROINTESTINAL ENDOSCOPY          Family History   Problem Relation Age of Onset    Depression Mother     Mental illness Mother     ADD / ADHD Mother     Anxiety disorder Mother     Bipolar disorder Mother     OCD Mother     Mental illness Father     Cancer Maternal Grandfather     Mental illness Maternal Grandmother     Coronary artery disease Maternal Grandmother     Prostate cancer Paternal Grandfather     Cancer Paternal Grandfather     Dementia Paternal  "Grandmother     Breast cancer Paternal Grandmother     Substance Abuse Maternal Uncle     Depression Maternal Uncle     Prostate cancer Maternal Uncle     Drug abuse Maternal Uncle     Heart defect Maternal Grandmother         Social History     Socioeconomic History    Marital status: Single     Spouse name: Not on file    Number of children: Not on file    Years of education: Not on file    Highest education level: Not on file   Occupational History    Not on file   Tobacco Use    Smoking status: Never    Smokeless tobacco: Never   Vaping Use    Vaping status: Never Used   Substance and Sexual Activity    Alcohol use: Never    Drug use: Never    Sexual activity: Yes     Partners: Male     Birth control/protection: Condom Male     Comment: Female Pill   Other Topics Concern    Not on file   Social History Narrative    ** Merged History Encounter **          Social Determinants of Health     Financial Resource Strain: Not on file   Food Insecurity: Not on file   Transportation Needs: Not on file   Physical Activity: Not on file   Stress: Not on file   Social Connections: Not on file   Intimate Partner Violence: Not on file   Housing Stability: Not on file        Physical Exam:     /66 (BP Location: Left arm, Patient Position: Sitting, Cuff Size: Standard)   Pulse 86   Temp 98 °F (36.7 °C) (Temporal)   Ht 5' 5\" (1.651 m)   Wt 65 kg (143 lb 6.4 oz)   SpO2 100%   BMI 23.86 kg/m²     Physical Exam  Vitals and nursing note reviewed.   Constitutional:       Appearance: Normal appearance. She is well-developed.   HENT:      Head: Normocephalic and atraumatic.      Right Ear: Tympanic membrane and external ear normal.      Left Ear: Tympanic membrane and external ear normal.      Nose: Nose normal.      Mouth/Throat:      Mouth: Mucous membranes are moist.   Eyes:      Conjunctiva/sclera: Conjunctivae normal.      Pupils: Pupils are equal, round, and reactive to light.   Neck:      Thyroid: No thyromegaly.      " Comments: No carotid bruits  Cardiovascular:      Rate and Rhythm: Normal rate and regular rhythm.      Heart sounds: Normal heart sounds. No murmur heard.  Pulmonary:      Effort: Pulmonary effort is normal. No respiratory distress.      Breath sounds: Normal breath sounds.   Abdominal:      General: Bowel sounds are normal. There is no distension.      Palpations: Abdomen is soft.      Tenderness: There is no abdominal tenderness.   Musculoskeletal:         General: Normal range of motion.      Cervical back: Normal range of motion and neck supple.   Skin:     General: Skin is warm.      Capillary Refill: Capillary refill takes less than 2 seconds.   Neurological:      Mental Status: She is alert and oriented to person, place, and time.      Cranial Nerves: No cranial nerve deficit.   Psychiatric:         Mood and Affect: Mood normal.         Thought Content: Thought content normal.          Data:     Pre-operative work-up    Laboratory Results:  n/a     EKG:  n/a    Chest x-ray:  n/a      Previous cardiopulmonary studies within the past year:  Echocardiogram: n/a  Cardiac Catheterization: n/a  Stress Test: n/a  Pulmonary Function Testing: n/a      Assessment & Recommendations:     1. Strabismus        2. Pre-op examination            Pre-Op Evaluation Assessment  23 y.o. female with planned surgery:  Known risk factors for perioperative complications: None.        Current medications which may produce withdrawal symptoms if withheld perioperatively: n/a    Pre-Op Evaluation Plan  1. Further preoperative workup as follows:   - None; no further preoperative work-up is required    2. Medication Management/Recommendations:   - None, continue medication regimen including morning of surgery, with sip of water    3. Prophylaxis for cardiac events with perioperative beta-blockers: not indicated.    4. Patient requires further consultation with: None    Clearance  Pt is LOW risk for surgery.     Sonja Carpenter,  MD [facility]  [address] [place] [address] [address]  [place] [address]  Phone#  [phone]  Fax#  [phone]

## 2024-03-20 ENCOUNTER — TELEPHONE (OUTPATIENT)
Age: 24
End: 2024-03-20

## 2024-03-20 NOTE — TELEPHONE ENCOUNTER
Radha from [facility] called regarding pt's pre-op clearance. She stated either clearance form or the office notes from 3/19, will need to be sent today by noon or pt's surgery set for 3/22 will be canceled. Please fax to [phone]. Thank you.

## 2024-04-22 ENCOUNTER — OFFICE VISIT (OUTPATIENT)
Dept: GASTROENTEROLOGY | Facility: CLINIC | Age: 24
End: 2024-04-22
Payer: COMMERCIAL

## 2024-04-22 VITALS
HEIGHT: 65 IN | DIASTOLIC BLOOD PRESSURE: 70 MMHG | BODY MASS INDEX: 23.89 KG/M2 | TEMPERATURE: 99 F | SYSTOLIC BLOOD PRESSURE: 112 MMHG | WEIGHT: 143.4 LBS

## 2024-04-22 DIAGNOSIS — K21.00 GASTROESOPHAGEAL REFLUX DISEASE WITH ESOPHAGITIS WITHOUT HEMORRHAGE: Primary | ICD-10-CM

## 2024-04-22 PROCEDURE — 99213 OFFICE O/P EST LOW 20 MIN: CPT | Performed by: PHYSICIAN ASSISTANT

## 2024-04-22 NOTE — PROGRESS NOTES
[facility] - Outpatient Follow-up Note  Santiago Vela 23 y.o. female MRN: [id number]  Encounter: [id number]  ASSESSMENT AND PLAN:    1. Gastroesophageal reflux disease with esophagitis without hemorrhage  Patient stable and doing well.  No new complaints or alarming symptoms today.  She is interested in tapering off of her antacid  medications.  I think this is reasonable.    At this time patient will stop pantoprazole  Discussed with patient she can continue with famotidine 40 mg once daily at bedtime for the next few weeks.  If continuing to do well, patient can start taking the famotidine 40 mg every other day for few weeks, then Monday Wednesday Friday for few weeks, then stop.  Discussed that while she weans off of her acid medications it is important that she follows a GERD diet/lifestyle.  We discussed the importance of avoiding fatty, greasy, spicy foods, citrus foods, excess caffeine, chocolate, alcohol.  She expressed understanding to this.    No plans for further workup at this time.  Patient was appreciative of visit today.    Patient was instructed to call the office with any questions, concerns, new/ worsening/ persisting GI symptoms. Advised patient go to the ER with any severe or worsening abdominal pain, fevers/ chills, intractable N/V, chest pain, SOB, dizziness, lightheadedness, feeling something stuck in esophagus that will not go down. Patient expressed understanding and is in agreement with treatment plan.     Will plan to follow up in 1 year  __________________________________________________________    SUBJECTIVE    Patient presents to the office today for follow-up.  Patient was last seen in the office by me 12/21/2023, previous office notes reviewed.  At last office visit I lowered dose of PPI.  I recommended patient take pantoprazole 40 mg once daily for breakfast and continue with Imodium 40 mg once daily at bedtime.    Patient reports feeling overall. No new  complaints or concerns today.   She is taking pantoprazole 40 mg before breakfast and  and famotidine 40 mg at bedtime. She declines heartburn or acid reflux on these medications.   She is very pleased with how she has been feeling.  She tells me that she has missed doses of both the pantoprazole and the famotidine and has not noticed a difference.  She is interested in weaning off of these medications.  BMs are normal, regular, formed and brown.   Her weight is stable since last visit.  Patient denies any current or recent fevers/ chills,abdominal pain, nausea, vomiting, change in bowel habits, diarrhea, constipation, black or bloody stools, heartburn, dysphagia, odynophagia. Denies chest pain, SOB,      Review of Systems   Constitutional:  Negative for chills and fever.   HENT:  Negative for ear pain and sore throat.    Eyes:  Negative for pain and visual disturbance.   Respiratory:  Negative for cough and shortness of breath.    Cardiovascular:  Negative for chest pain and palpitations.   Gastrointestinal:  Negative for anal bleeding and vomiting.   Genitourinary:  Negative for dysuria and hematuria.   Musculoskeletal:  Negative for arthralgias and back pain.   Skin:  Negative for color change and rash.   Neurological:  Negative for seizures and syncope.   All other systems reviewed and are negative.         Historical Information   Past Medical History:   Diagnosis Date    Amplified musculoskeletal pain syndrome      Past Surgical History:   Procedure Laterality Date    COLONOSCOPY      UPPER GASTROINTESTINAL ENDOSCOPY       Social History   Social History     Substance and Sexual Activity   Alcohol Use Never     Social History     Substance and Sexual Activity   Drug Use Never     Social History     Tobacco Use   Smoking Status Never   Smokeless Tobacco Never     Family History   Problem Relation Age of Onset    Depression Mother     Mental illness Mother     ADD / ADHD Mother     Anxiety disorder Mother      Bipolar disorder Mother     OCD Mother     Mental illness Father     Cancer Maternal Grandfather     Mental illness Maternal Grandmother     Coronary artery disease Maternal Grandmother     Prostate cancer Paternal Grandfather     Cancer Paternal Grandfather     Dementia Paternal Grandmother     Breast cancer Paternal Grandmother     Substance Abuse Maternal Uncle     Depression Maternal Uncle     Prostate cancer Maternal Uncle     Drug abuse Maternal Uncle     Heart defect Maternal Grandmother        Meds/Allergies       Current Outpatient Medications:     famotidine (PEPCID) 40 MG tablet    Deja NESSA 1.5/30 1.5-30 MG-MCG tablet    pantoprazole (PROTONIX) 40 mg tablet    No Known Allergies        Objective     Wt Readings from Last 3 Encounters:   04/22/24 65 kg (143 lb 6.4 oz)   03/19/24 65 kg (143 lb 6.4 oz)   01/23/24 66.4 kg (146 lb 6.4 oz)     Temp Readings from Last 3 Encounters:   04/22/24 99 °F (37.2 °C) (Tympanic)   03/19/24 98 °F (36.7 °C) (Temporal)   12/21/23 (!) 96.4 °F (35.8 °C)     BP Readings from Last 3 Encounters:   04/22/24 112/70   03/19/24 118/66   01/23/24 110/74     Pulse Readings from Last 3 Encounters:   03/19/24 86   01/23/24 80   11/27/23 65          PHYSICAL EXAM:      Physical Exam  Vitals reviewed.   Constitutional:       General: She is not in acute distress.     Appearance: She is not toxic-appearing.   HENT:      Head: Normocephalic and atraumatic.   Eyes:      Extraocular Movements: Extraocular movements intact.      Conjunctiva/sclera: Conjunctivae normal.   Cardiovascular:      Rate and Rhythm: Normal rate and regular rhythm.   Pulmonary:      Effort: Pulmonary effort is normal. No respiratory distress.      Breath sounds: Normal breath sounds.   Abdominal:      General: Bowel sounds are normal.      Palpations: Abdomen is soft.      Tenderness: There is no abdominal tenderness.   Musculoskeletal:         General: No swelling or tenderness.      Cervical back: Normal range of  motion and neck supple.   Skin:     General: Skin is warm and dry.      Coloration: Skin is not jaundiced.   Neurological:      General: No focal deficit present.      Mental Status: She is alert and oriented to person, place, and time. Mental status is at baseline.   Psychiatric:         Mood and Affect: Mood normal.         Behavior: Behavior normal.         Thought Content: Thought content normal.          Lab Results:   No visits with results within 1 Day(s) from this visit.   Latest known visit with results is:   Appointment on 01/08/2024   Component Date Value    TSH 3RD GENERATON 01/08/2024 1.862     Free T4 01/08/2024 0.87        Lab Results   Component Value Date    WBC 7.96 11/27/2023    HGB 13.0 11/27/2023    HCT 36.4 11/27/2023    MCV 91 11/27/2023     11/27/2023       Lab Results   Component Value Date    SODIUM 138 11/27/2023    K 3.6 11/27/2023     11/27/2023    CO2 26 11/27/2023    AGAP 5 11/27/2023    BUN 5 11/27/2023    CREATININE 0.68 11/27/2023    GLUC 110 11/27/2023    CALCIUM 8.7 11/27/2023    AST 15 07/05/2023    ALT 25 07/05/2023    ALKPHOS 59 07/05/2023    TP 7.9 07/05/2023    TBILI 0.64 07/05/2023    EGFR 124 11/27/2023     Celiac testing was negative 8/2023     Prior Procedure Results/Reports   EGD 8/31/2023 reviewed and showed LA grade C esophagitis, 1 cm hiatal hernia otherwise normal.  Distal esophageal biopsies showed mild chronic inflammation.  Gastric biopsies negative for H. pylori.     Repeat EGD was completed 11/27/2023 which revealed mild abnormal mucosa with plaque in the lower third of the esophagus, 1 cm hiatal was seen, otherwise normal stomach and duodenum.  Mid and proximal esophageal biopsies were normal and negative for EOE.  Distal esophageal biopsy showed chronic inflammation negative for Hernandez's.     Colonoscopy 8/31/2023 reviewed and was completely normal aside from worms seen throughout entire colon. Was recommended to  empirically treat with  albendazole 400 mg once, then repeat in 2 weeks for suspected pinworm infection.  Random colon biopsies were normal. Pathology of warm specimens seen during procedure was unable to be assessed.    Jasmin Menon PA-C   Available on [employer]

## 2024-04-23 DIAGNOSIS — R11.0 POSTPRANDIAL NAUSEA: ICD-10-CM

## 2024-04-23 DIAGNOSIS — R10.84 GENERALIZED ABDOMINAL PAIN: ICD-10-CM

## 2024-04-24 RX ORDER — FAMOTIDINE 40 MG/1
40 TABLET, FILM COATED ORAL
Qty: 90 TABLET | Refills: 1 | Status: SHIPPED | OUTPATIENT
Start: 2024-04-24

## 2025-01-09 ENCOUNTER — OFFICE VISIT (OUTPATIENT)
Dept: FAMILY MEDICINE CLINIC | Facility: CLINIC | Age: 25
End: 2025-01-09
Payer: COMMERCIAL

## 2025-01-09 VITALS
SYSTOLIC BLOOD PRESSURE: 118 MMHG | TEMPERATURE: 97.5 F | BODY MASS INDEX: 23.19 KG/M2 | OXYGEN SATURATION: 100 % | HEIGHT: 65 IN | WEIGHT: 139.2 LBS | DIASTOLIC BLOOD PRESSURE: 60 MMHG | HEART RATE: 96 BPM

## 2025-01-09 DIAGNOSIS — J06.9 UPPER RESPIRATORY TRACT INFECTION, UNSPECIFIED TYPE: Primary | ICD-10-CM

## 2025-01-09 PROCEDURE — 99213 OFFICE O/P EST LOW 20 MIN: CPT | Performed by: FAMILY MEDICINE

## 2025-01-09 RX ORDER — FLUTICASONE PROPIONATE 50 MCG
1 SPRAY, SUSPENSION (ML) NASAL DAILY
Qty: 16 G | Refills: 0 | Status: SHIPPED | OUTPATIENT
Start: 2025-01-09

## 2025-01-09 RX ORDER — BENZONATATE 200 MG/1
200 CAPSULE ORAL 3 TIMES DAILY PRN
Qty: 30 CAPSULE | Refills: 0 | Status: SHIPPED | OUTPATIENT
Start: 2025-01-09

## 2025-01-09 RX ORDER — SODIUM FLUORIDE 6.1 MG/ML
PASTE, DENTIFRICE DENTAL
COMMUNITY
Start: 2024-12-03

## 2025-01-09 RX ORDER — AZITHROMYCIN 250 MG/1
TABLET, FILM COATED ORAL
Qty: 6 TABLET | Refills: 0 | Status: SHIPPED | OUTPATIENT
Start: 2025-01-09 | End: 2025-01-14

## 2025-01-09 RX ORDER — FLUCONAZOLE 150 MG/1
TABLET ORAL
COMMUNITY
Start: 2024-11-27

## 2025-01-09 NOTE — PROGRESS NOTES
"Name: Santiago Vela      : 2000      MRN: [id number]  Encounter Provider: Malini Andrea MD  Encounter Date: 2025   Encounter department: [facility]  :  Assessment & Plan  Upper respiratory tract infection, unspecified type  - Start course of azithromycin. Recommend to continue to keep hydrated; may take OTC medications as well as neti pot, flonase for congestion, humidifier etc.   Orders:    azithromycin (Zithromax) 250 mg tablet; Take 2 tablets (500 mg total) by mouth daily for 1 day, THEN 1 tablet (250 mg total) daily for 4 days.    benzonatate (TESSALON) 200 MG capsule; Take 1 capsule (200 mg total) by mouth 3 (three) times a day as needed for cough    fluticasone (FLONASE) 50 mcg/act nasal spray; 1 spray into each nostril daily           History of Present Illness     URI   This is a new problem. Episode onset: 2 weeks ago. Progression since onset: Initially improving but now worsening. There has been no fever. Associated symptoms include congestion, coughing and a sore throat. Treatments tried: OTC medication, hydration. The treatment provided no relief.       Review of Systems   Constitutional:  Negative for chills and fever.   HENT:  Positive for congestion and sore throat.    Eyes: Negative.    Respiratory:  Positive for cough.    Gastrointestinal: Negative.    Genitourinary: Negative.    Musculoskeletal: Negative.    Neurological: Negative.        Objective   /60 (BP Location: Left arm, Patient Position: Sitting, Cuff Size: Standard)   Pulse 96   Temp 97.5 °F (36.4 °C) (Temporal)   Ht 5' 5\" (1.651 m)   Wt 63.1 kg (139 lb 3.2 oz)   SpO2 100%   BMI 23.16 kg/m²      Physical Exam  Constitutional:       General: She is not in acute distress.     Appearance: She is not ill-appearing.   HENT:      Head: Normocephalic and atraumatic.      Right Ear: Tympanic membrane and ear canal normal.      Left Ear: Tympanic membrane and ear canal normal.      Mouth/Throat:      Mouth: " Mucous membranes are moist.      Pharynx: No oropharyngeal exudate or posterior oropharyngeal erythema.      Tonsils: No tonsillar exudate or tonsillar abscesses.   Cardiovascular:      Rate and Rhythm: Normal rate.   Pulmonary:      Effort: Pulmonary effort is normal. No respiratory distress.      Breath sounds: No wheezing.   Neurological:      General: No focal deficit present.      Mental Status: She is alert.   Psychiatric:         Mood and Affect: Mood normal.         Behavior: Behavior normal.          Alert and oriented to person, place, time/situation. normal mood and affect. no apparent risk to self or others.

## 2025-01-31 DIAGNOSIS — J06.9 UPPER RESPIRATORY TRACT INFECTION, UNSPECIFIED TYPE: ICD-10-CM

## 2025-01-31 RX ORDER — FLUTICASONE PROPIONATE 50 MCG
SPRAY, SUSPENSION (ML) NASAL
Qty: 36 ML | Refills: 1 | Status: SHIPPED | OUTPATIENT
Start: 2025-01-31

## 2025-04-04 ENCOUNTER — TELEPHONE (OUTPATIENT)
Dept: GASTROENTEROLOGY | Facility: CLINIC | Age: 25
End: 2025-04-04

## 2025-04-04 NOTE — TELEPHONE ENCOUNTER
LMOM In review of our records, it shows you are due for the followin year follow up office visit  Please call the office to schedule your appointment [phone].

## 2025-05-28 ENCOUNTER — TELEPHONE (OUTPATIENT)
Dept: GASTROENTEROLOGY | Facility: CLINIC | Age: 25
End: 2025-05-28

## 2025-05-28 NOTE — TELEPHONE ENCOUNTER
LMOM 2nd attempt In review of our records, it shows you are due for the followin year follow up office visit  Please call the office to schedule your appointment [phone].